# Patient Record
Sex: MALE | Race: WHITE | NOT HISPANIC OR LATINO
[De-identification: names, ages, dates, MRNs, and addresses within clinical notes are randomized per-mention and may not be internally consistent; named-entity substitution may affect disease eponyms.]

---

## 2018-01-19 PROBLEM — Z00.00 ENCOUNTER FOR PREVENTIVE HEALTH EXAMINATION: Status: ACTIVE | Noted: 2018-01-19

## 2018-02-20 ENCOUNTER — APPOINTMENT (OUTPATIENT)
Dept: ENDOCRINOLOGY | Facility: CLINIC | Age: 73
End: 2018-02-20

## 2018-06-18 ENCOUNTER — LABORATORY RESULT (OUTPATIENT)
Age: 73
End: 2018-06-18

## 2018-06-18 ENCOUNTER — APPOINTMENT (OUTPATIENT)
Dept: NEPHROLOGY | Facility: CLINIC | Age: 73
End: 2018-06-18
Payer: MEDICARE

## 2018-06-18 VITALS — HEIGHT: 65 IN | BODY MASS INDEX: 27.49 KG/M2 | WEIGHT: 165 LBS

## 2018-06-18 VITALS — SYSTOLIC BLOOD PRESSURE: 155 MMHG | DIASTOLIC BLOOD PRESSURE: 83 MMHG | HEART RATE: 51 BPM

## 2018-06-18 VITALS — DIASTOLIC BLOOD PRESSURE: 110 MMHG | HEART RATE: 61 BPM | SYSTOLIC BLOOD PRESSURE: 184 MMHG

## 2018-06-18 DIAGNOSIS — M25.569 PAIN IN UNSPECIFIED KNEE: ICD-10-CM

## 2018-06-18 DIAGNOSIS — Z78.9 OTHER SPECIFIED HEALTH STATUS: ICD-10-CM

## 2018-06-18 DIAGNOSIS — Z87.891 PERSONAL HISTORY OF NICOTINE DEPENDENCE: ICD-10-CM

## 2018-06-18 DIAGNOSIS — Z84.1 FAMILY HISTORY OF DISORDERS OF KIDNEY AND URETER: ICD-10-CM

## 2018-06-18 PROCEDURE — 36415 COLL VENOUS BLD VENIPUNCTURE: CPT

## 2018-06-18 PROCEDURE — 99205 OFFICE O/P NEW HI 60 MIN: CPT | Mod: 25

## 2018-06-24 LAB
25(OH)D3 SERPL-MCNC: 33.5 NG/ML
ALBUMIN SERPL ELPH-MCNC: 4.9 G/DL
ALP BLD-CCNC: 75 U/L
ALT SERPL-CCNC: 29 U/L
ANION GAP SERPL CALC-SCNC: 15 MMOL/L
APPEARANCE: CLEAR
AST SERPL-CCNC: 27 U/L
BASOPHILS # BLD AUTO: 0.04 K/UL
BASOPHILS NFR BLD AUTO: 0.6 %
BILIRUB SERPL-MCNC: 0.7 MG/DL
BILIRUBIN URINE: NEGATIVE
BLOOD URINE: NEGATIVE
BUN SERPL-MCNC: 34 MG/DL
CALCIUM SERPL-MCNC: 10.1 MG/DL
CALCIUM SERPL-MCNC: 10.1 MG/DL
CHLORIDE SERPL-SCNC: 103 MMOL/L
CO2 SERPL-SCNC: 25 MMOL/L
COLOR: YELLOW
CREAT SERPL-MCNC: 1.75 MG/DL
CREAT SPEC-SCNC: 82 MG/DL
DEPRECATED KAPPA LC FREE/LAMBDA SER: 0.98 RATIO
EOSINOPHIL # BLD AUTO: 0.08 K/UL
EOSINOPHIL NFR BLD AUTO: 1.2 %
GLUCOSE QUALITATIVE U: 500 MG/DL
GLUCOSE SERPL-MCNC: 86 MG/DL
HCT VFR BLD CALC: 44.2 %
HGB BLD-MCNC: 13.9 G/DL
IGA 24H UR QL IFE: NORMAL
IMM GRANULOCYTES NFR BLD AUTO: 0.2 %
KAPPA LC CSF-MCNC: 1.92 MG/DL
KAPPA LC SERPL-MCNC: 1.88 MG/DL
KETONES URINE: NEGATIVE
LEUKOCYTE ESTERASE URINE: NEGATIVE
LYMPHOCYTES # BLD AUTO: 2.03 K/UL
LYMPHOCYTES NFR BLD AUTO: 31.5 %
M PROTEIN SPEC IFE-MCNC: NORMAL
MAGNESIUM SERPL-MCNC: 2.1 MG/DL
MAN DIFF?: NORMAL
MCHC RBC-ENTMCNC: 25.7 PG
MCHC RBC-ENTMCNC: 31.4 GM/DL
MCV RBC AUTO: 81.9 FL
MICROALBUMIN 24H UR DL<=1MG/L-MCNC: 12.2 MG/DL
MICROALBUMIN/CREAT 24H UR-RTO: 149 MG/G
MONOCYTES # BLD AUTO: 0.66 K/UL
MONOCYTES NFR BLD AUTO: 10.2 %
NEUTROPHILS # BLD AUTO: 3.62 K/UL
NEUTROPHILS NFR BLD AUTO: 56.3 %
NITRITE URINE: NEGATIVE
PARATHYROID HORMONE INTACT: 72 PG/ML
PH URINE: 5
PHOSPHATE SERPL-MCNC: 3.3 MG/DL
PLATELET # BLD AUTO: 198 K/UL
POTASSIUM SERPL-SCNC: 4.7 MMOL/L
PROT SERPL-MCNC: 7.4 G/DL
PROTEIN URINE: 30 MG/DL
PSA SERPL-MCNC: 6.43 NG/ML
RBC # BLD: 5.4 M/UL
RBC # FLD: 15.8 %
SODIUM SERPL-SCNC: 143 MMOL/L
SPECIFIC GRAVITY URINE: 1.02
TSH SERPL-ACNC: 0.78 UIU/ML
URATE SERPL-MCNC: 7.2 MG/DL
UROBILINOGEN URINE: NEGATIVE MG/DL
VIT B12 SERPL-MCNC: 798 PG/ML
WBC # FLD AUTO: 6.44 K/UL

## 2018-07-09 ENCOUNTER — FORM ENCOUNTER (OUTPATIENT)
Age: 73
End: 2018-07-09

## 2018-07-10 ENCOUNTER — APPOINTMENT (OUTPATIENT)
Dept: ULTRASOUND IMAGING | Facility: HOSPITAL | Age: 73
End: 2018-07-10
Payer: MEDICARE

## 2018-07-10 ENCOUNTER — OUTPATIENT (OUTPATIENT)
Dept: OUTPATIENT SERVICES | Facility: HOSPITAL | Age: 73
LOS: 1 days | End: 2018-07-10
Payer: MEDICARE

## 2018-07-10 PROCEDURE — 76770 US EXAM ABDO BACK WALL COMP: CPT | Mod: 26

## 2018-07-11 PROCEDURE — 76770 US EXAM ABDO BACK WALL COMP: CPT

## 2018-07-11 PROCEDURE — 90715 TDAP VACCINE 7 YRS/> IM: CPT

## 2018-07-16 ENCOUNTER — APPOINTMENT (OUTPATIENT)
Dept: NEPHROLOGY | Facility: CLINIC | Age: 73
End: 2018-07-16
Payer: MEDICARE

## 2018-07-16 ENCOUNTER — LABORATORY RESULT (OUTPATIENT)
Age: 73
End: 2018-07-16

## 2018-07-16 VITALS — WEIGHT: 162 LBS | BODY MASS INDEX: 26.96 KG/M2

## 2018-07-16 VITALS — HEART RATE: 55 BPM | SYSTOLIC BLOOD PRESSURE: 126 MMHG | DIASTOLIC BLOOD PRESSURE: 78 MMHG

## 2018-07-16 PROCEDURE — 36415 COLL VENOUS BLD VENIPUNCTURE: CPT

## 2018-07-16 PROCEDURE — 99214 OFFICE O/P EST MOD 30 MIN: CPT | Mod: 25

## 2018-07-21 LAB
ALBUMIN SERPL ELPH-MCNC: 4.9 G/DL
ALP BLD-CCNC: 76 U/L
ALT SERPL-CCNC: 22 U/L
ANION GAP SERPL CALC-SCNC: 17 MMOL/L
APPEARANCE: CLEAR
AST SERPL-CCNC: 25 U/L
BASOPHILS # BLD AUTO: 0.04 K/UL
BASOPHILS NFR BLD AUTO: 0.5 %
BILIRUB SERPL-MCNC: 0.7 MG/DL
BILIRUBIN URINE: NEGATIVE
BLOOD URINE: NEGATIVE
BUN SERPL-MCNC: 44 MG/DL
CALCIUM SERPL-MCNC: 10.2 MG/DL
CHLORIDE SERPL-SCNC: 99 MMOL/L
CO2 SERPL-SCNC: 26 MMOL/L
COLOR: YELLOW
CREAT SERPL-MCNC: 1.84 MG/DL
CREAT SPEC-SCNC: 135 MG/DL
EOSINOPHIL # BLD AUTO: 0.15 K/UL
EOSINOPHIL NFR BLD AUTO: 1.8 %
GLUCOSE QUALITATIVE U: NEGATIVE MG/DL
GLUCOSE SERPL-MCNC: 106 MG/DL
HCT VFR BLD CALC: 45.3 %
HGB BLD-MCNC: 14.8 G/DL
IMM GRANULOCYTES NFR BLD AUTO: 0.1 %
KETONES URINE: NEGATIVE
LEUKOCYTE ESTERASE URINE: NEGATIVE
LYMPHOCYTES # BLD AUTO: 2.3 K/UL
LYMPHOCYTES NFR BLD AUTO: 28.4 %
MAGNESIUM SERPL-MCNC: 2.2 MG/DL
MAN DIFF?: NORMAL
MCHC RBC-ENTMCNC: 27.2 PG
MCHC RBC-ENTMCNC: 32.7 GM/DL
MCV RBC AUTO: 83.1 FL
MICROALBUMIN 24H UR DL<=1MG/L-MCNC: 17.7 MG/DL
MICROALBUMIN/CREAT 24H UR-RTO: 131 MG/G
MONOCYTES # BLD AUTO: 0.73 K/UL
MONOCYTES NFR BLD AUTO: 9 %
NEUTROPHILS # BLD AUTO: 4.88 K/UL
NEUTROPHILS NFR BLD AUTO: 60.2 %
NITRITE URINE: NEGATIVE
PH URINE: 5
PHOSPHATE SERPL-MCNC: 3.7 MG/DL
PLATELET # BLD AUTO: 217 K/UL
POTASSIUM SERPL-SCNC: 4.6 MMOL/L
PROT SERPL-MCNC: 7.9 G/DL
PROTEIN URINE: 100 MG/DL
RBC # BLD: 5.45 M/UL
RBC # FLD: 15.3 %
SODIUM SERPL-SCNC: 142 MMOL/L
SPECIFIC GRAVITY URINE: 1.02
UROBILINOGEN URINE: NEGATIVE MG/DL
WBC # FLD AUTO: 8.11 K/UL

## 2018-10-18 ENCOUNTER — APPOINTMENT (OUTPATIENT)
Dept: NEPHROLOGY | Facility: CLINIC | Age: 73
End: 2018-10-18

## 2018-11-14 ENCOUNTER — APPOINTMENT (OUTPATIENT)
Dept: NEPHROLOGY | Facility: CLINIC | Age: 73
End: 2018-11-14
Payer: MEDICARE

## 2018-11-14 VITALS — WEIGHT: 173 LBS | BODY MASS INDEX: 28.79 KG/M2

## 2018-11-14 VITALS — DIASTOLIC BLOOD PRESSURE: 79 MMHG | HEART RATE: 58 BPM | SYSTOLIC BLOOD PRESSURE: 145 MMHG

## 2018-11-14 VITALS — DIASTOLIC BLOOD PRESSURE: 84 MMHG | SYSTOLIC BLOOD PRESSURE: 148 MMHG

## 2018-11-14 PROCEDURE — 99214 OFFICE O/P EST MOD 30 MIN: CPT

## 2018-12-18 ENCOUNTER — APPOINTMENT (OUTPATIENT)
Dept: NEPHROLOGY | Facility: CLINIC | Age: 73
End: 2018-12-18
Payer: MEDICARE

## 2018-12-18 VITALS — SYSTOLIC BLOOD PRESSURE: 127 MMHG | HEART RATE: 60 BPM | DIASTOLIC BLOOD PRESSURE: 72 MMHG

## 2018-12-18 VITALS — DIASTOLIC BLOOD PRESSURE: 77 MMHG | SYSTOLIC BLOOD PRESSURE: 124 MMHG

## 2018-12-18 VITALS — BODY MASS INDEX: 29.29 KG/M2 | WEIGHT: 176 LBS

## 2018-12-18 PROCEDURE — 99214 OFFICE O/P EST MOD 30 MIN: CPT

## 2019-01-29 ENCOUNTER — LABORATORY RESULT (OUTPATIENT)
Age: 74
End: 2019-01-29

## 2019-01-29 ENCOUNTER — APPOINTMENT (OUTPATIENT)
Dept: NEPHROLOGY | Facility: CLINIC | Age: 74
End: 2019-01-29
Payer: MEDICARE

## 2019-01-29 VITALS — SYSTOLIC BLOOD PRESSURE: 133 MMHG | HEART RATE: 69 BPM | DIASTOLIC BLOOD PRESSURE: 73 MMHG

## 2019-01-29 VITALS — BODY MASS INDEX: 28.96 KG/M2 | WEIGHT: 174 LBS

## 2019-01-29 VITALS — SYSTOLIC BLOOD PRESSURE: 130 MMHG | DIASTOLIC BLOOD PRESSURE: 73 MMHG

## 2019-01-29 PROCEDURE — 99214 OFFICE O/P EST MOD 30 MIN: CPT | Mod: 25

## 2019-01-29 PROCEDURE — 36415 COLL VENOUS BLD VENIPUNCTURE: CPT

## 2019-01-29 RX ORDER — POTASSIUM CITRATE 10 MEQ/1
10 MEQ TABLET, EXTENDED RELEASE ORAL
Qty: 180 | Refills: 3 | Status: DISCONTINUED | COMMUNITY
Start: 2018-12-18 | End: 2019-01-29

## 2019-01-29 NOTE — PHYSICAL EXAM
[General Appearance - Alert] : alert [General Appearance - In No Acute Distress] : in no acute distress [Sclera] : the sclera and conjunctiva were normal [PERRL With Normal Accommodation] : pupils were equal in size, round, and reactive to light [Extraocular Movements] : extraocular movements were intact [Outer Ear] : the ears and nose were normal in appearance [Oropharynx] : the oropharynx was normal [Neck Appearance] : the appearance of the neck was normal [Neck Cervical Mass (___cm)] : no neck mass was observed [Jugular Venous Distention Increased] : there was no jugular-venous distention [Thyroid Diffuse Enlargement] : the thyroid was not enlarged [Thyroid Nodule] : there were no palpable thyroid nodules [Auscultation Breath Sounds / Voice Sounds] : lungs were clear to auscultation bilaterally [Heart Rate And Rhythm] : heart rate was normal and rhythm regular [Heart Sounds] : normal S1 and S2 [Heart Sounds Gallop] : no gallops [Murmurs] : no murmurs [Heart Sounds Pericardial Friction Rub] : no pericardial rub [Veins - Varicosity Changes] : there were no varicosital changes [FreeTextEntry1] : pitting bilateral ankle edema [Bowel Sounds] : normal bowel sounds [Abdomen Soft] : soft [Abdomen Tenderness] : non-tender [Abdomen Mass (___ Cm)] : no abdominal mass palpated [Cervical Lymph Nodes Enlarged Posterior Bilaterally] : posterior cervical [Cervical Lymph Nodes Enlarged Anterior Bilaterally] : anterior cervical [Supraclavicular Lymph Nodes Enlarged Bilaterally] : supraclavicular [Abnormal Walk] : normal gait [Nail Clubbing] : no clubbing  or cyanosis of the fingernails [Musculoskeletal - Swelling] : no joint swelling seen [Motor Tone] : muscle strength and tone were normal [Skin Color & Pigmentation] : normal skin color and pigmentation [Skin Turgor] : normal skin turgor [] : no rash [Oriented To Time, Place, And Person] : oriented to person, place, and time [Impaired Insight] : insight and judgment were intact [Affect] : the affect was normal

## 2019-01-29 NOTE — ASSESSMENT
[FreeTextEntry1] : # CKD stage 3.\par * The patient has been counseled that chronic kidney disease is a significant condition and regular office followup with me (at least every 2 months for now) is essential for monitoring, and that it is their responsibility to make a follow up appointment. The patient has been counseled never to stop taking their medications without discussing it with me or another doctor. The patient has been counseled on risk of acute renal failure and instructed to immediately call and speak with me or go immediately to ER with any severe symptoms, nausea, vomiting, diarrhea, chest pain, or shortness of breath. The patient has been counseled on avoiding NSAIDs. Reducing or avoiding red meat and starting folate 800 mg qd has been advised. A counseling information sheet has been given. All their questions were answered.\par * Recheck labs next visit. \par \par # Nephrolithiasis.\par * Low urine citrate. Start sodium bicarbonate 650 mg 3 tabs daily (instead of urocit K, which wasn't tolerated). \par \par # PSA elevation.\par * Follow up with urologist. His PSA has been persistently elevated and this is known. \par * Recheck. \par \par # HTN uncontrolled.\par * Increase toprol to 100 mg. \par * The patient's blood pressure was checked with the Omron HEM-907XL using the SPRINT trial protocol after sitting quietly in an empty room with arm supported, back supported, and feet on the floor for 5 minutes. The average of 3 readings were taken. \par * The patient has been advised to check their BP at home with an automatic arm cuff, write down the readings, and reach me directly on the phone immediately if they are persistently > 180 systolic or if SBP is less than 100 or if lightheadedness develops. They were advised to bring in all blood pressure readings and medications next visit.\par * The patient has been counseled that they have a a significant medical condition and regular office followup (at least every 2 month for now) is essential for monitoring, and that it is their responsibility to make follow up appointments. The patient also understands that they must never stop or change any medications without discussing this with me (or another physician). A counseling information sheet has been given. All their questions were answered. \par

## 2019-01-29 NOTE — HISTORY OF PRESENT ILLNESS
[FreeTextEntry1] : Referred by Dr. Lezama for CKD. PCP: Dr. Paul Guzman. \par \par * HTN borderline controlled. /80s  at home. No lightheadedness. Compliant with medications. Following low salt diet.  * In 2016 he had stenting of left renal artery. * He requires omeprazole for severe GERD and ranitidine has been ineffective. * Urine albumin 131 mg/g.* CKD stable, creatinine 1.84.  * He is following up closely with urologist for elevated PSA. * DM controlled, HGA1c 6.8. *  24 hour urine on 2/18 showed low urine citrate. His last kidney stone was in 2016. 11/17 US did not show nephrolithiasis. He was unable to tolerate potassium citrate due to GERD. \par \par

## 2019-01-29 NOTE — CONSULT LETTER
[FreeTextEntry1] : Dear Dr. Guzman,\par \par I had the pleasure of seeing Wilfred Pruitt in followup for kidney disease. My note is attached.\par \par Thank you for the opportunity to participate in the care of your patient.\par \par Please call me on my cell phone at [phone] or in the office at [phone] if you have any questions.\par \par Best regards,\par \par Kodak\par \par Kodak Humphrey MD, FACP, FASN\par [email or web address]\par Office: [phone]\par Mobile: [phone]

## 2019-02-05 ENCOUNTER — TRANSCRIPTION ENCOUNTER (OUTPATIENT)
Age: 74
End: 2019-02-05

## 2019-02-09 LAB
25(OH)D3 SERPL-MCNC: 28.3 NG/ML
ALBUMIN SERPL ELPH-MCNC: 4.6 G/DL
ALP BLD-CCNC: 68 U/L
ALT SERPL-CCNC: 26 U/L
ANION GAP SERPL CALC-SCNC: 15 MMOL/L
APPEARANCE: CLEAR
AST SERPL-CCNC: 27 U/L
BASOPHILS # BLD AUTO: 0.03 K/UL
BASOPHILS NFR BLD AUTO: 0.5 %
BILIRUB SERPL-MCNC: 0.6 MG/DL
BILIRUBIN URINE: NEGATIVE
BLOOD URINE: NEGATIVE
BUN SERPL-MCNC: 40 MG/DL
CALCIUM SERPL-MCNC: 9.8 MG/DL
CALCIUM SERPL-MCNC: 9.8 MG/DL
CHLORIDE SERPL-SCNC: 104 MMOL/L
CHOLEST SERPL-MCNC: 182 MG/DL
CHOLEST/HDLC SERPL: 4.3 RATIO
CO2 SERPL-SCNC: 23 MMOL/L
COLOR: YELLOW
CREAT SERPL-MCNC: 1.84 MG/DL
CREAT SPEC-SCNC: 93 MG/DL
CREAT SPEC-SCNC: 93 MG/DL
CREAT/PROT UR: 0.7 RATIO
EOSINOPHIL # BLD AUTO: 0.07 K/UL
EOSINOPHIL NFR BLD AUTO: 1.1 %
FERRITIN SERPL-MCNC: 130 NG/ML
GLUCOSE QUALITATIVE U: 500 MG/DL
GLUCOSE SERPL-MCNC: 166 MG/DL
HBA1C MFR BLD HPLC: 7.6 %
HCT VFR BLD CALC: 45 %
HDLC SERPL-MCNC: 42 MG/DL
HGB BLD-MCNC: 14.6 G/DL
IMM GRANULOCYTES NFR BLD AUTO: 0.2 %
KETONES URINE: NEGATIVE
LDLC SERPL CALC-MCNC: 89 MG/DL
LEUKOCYTE ESTERASE URINE: NEGATIVE
LYMPHOCYTES # BLD AUTO: 2 K/UL
LYMPHOCYTES NFR BLD AUTO: 31.4 %
MAGNESIUM SERPL-MCNC: 2.1 MG/DL
MAN DIFF?: NORMAL
MCHC RBC-ENTMCNC: 27.2 PG
MCHC RBC-ENTMCNC: 32.4 GM/DL
MCV RBC AUTO: 84 FL
MICROALBUMIN 24H UR DL<=1MG/L-MCNC: 27.9 MG/DL
MICROALBUMIN/CREAT 24H UR-RTO: 299 MG/G
MONOCYTES # BLD AUTO: 0.39 K/UL
MONOCYTES NFR BLD AUTO: 6.1 %
NEUTROPHILS # BLD AUTO: 3.87 K/UL
NEUTROPHILS NFR BLD AUTO: 60.7 %
NITRITE URINE: NEGATIVE
PARATHYROID HORMONE INTACT: 71 PG/ML
PH URINE: 5.5
PHOSPHATE SERPL-MCNC: 3.1 MG/DL
PLATELET # BLD AUTO: 233 K/UL
POTASSIUM SERPL-SCNC: 4 MMOL/L
PROT SERPL-MCNC: 7.4 G/DL
PROT UR-MCNC: 66 MG/DL
PROTEIN URINE: 30 MG/DL
PSA SERPL-MCNC: 6.47 NG/ML
RBC # BLD: 5.36 M/UL
RBC # FLD: 15.1 %
SODIUM SERPL-SCNC: 142 MMOL/L
SPECIFIC GRAVITY URINE: 1.02
T4 FREE SERPL-MCNC: 1.5 NG/DL
TRIGL SERPL-MCNC: 253 MG/DL
TSH SERPL-ACNC: 0.9 UIU/ML
URATE SERPL-MCNC: 7.8 MG/DL
UROBILINOGEN URINE: NEGATIVE MG/DL
VIT B12 SERPL-MCNC: 951 PG/ML
WBC # FLD AUTO: 6.37 K/UL

## 2019-04-02 ENCOUNTER — APPOINTMENT (OUTPATIENT)
Dept: NEPHROLOGY | Facility: CLINIC | Age: 74
End: 2019-04-02
Payer: MEDICARE

## 2019-04-02 ENCOUNTER — LABORATORY RESULT (OUTPATIENT)
Age: 74
End: 2019-04-02

## 2019-04-02 VITALS — SYSTOLIC BLOOD PRESSURE: 139 MMHG | HEART RATE: 61 BPM | DIASTOLIC BLOOD PRESSURE: 77 MMHG

## 2019-04-02 PROCEDURE — 99214 OFFICE O/P EST MOD 30 MIN: CPT

## 2019-04-02 NOTE — ASSESSMENT
[FreeTextEntry1] : # CKD stage 3 consistent with DM.\par * Recheck labs.\par * Start lisinopril 2.5 mg daily for CKD/proteinuria. Benefits, alternatives, and risks to medication including but not limited to low blood pressure, electrolyte problems discussed and they consent. UpToDate patient information form on medication provided.\par * The patient has been counseled that chronic kidney disease is a significant condition and regular office followup with me (at least every 1 month for now) is essential for monitoring, and that it is their responsibility to make a follow up appointment.\par * The patient has been counseled never to stop taking their medications without discussing it with me or another doctor.\par * The patient has been counseled on risk of acute renal failure and instructed to immediately call and speak with me or go immediately to ER with any severe symptoms, nausea, vomiting, diarrhea, chest pain, or shortness of breath.\par * The patient has been counseled on avoiding NSAIDs.\par * Reducing or avoiding red meat, following a relatively low oxalate diet, and starting folate 800 mg qd has been advised.\par * A counseling information sheet has been given and they were provided sufficient time to review this. All their questions were answered.\par \par # HTN uncontrolled.\par * The patient's blood pressure was checked with the Omron [id number]-907XL using the SPRINT trial protocol after sitting quietly in an empty room with arm supported, back supported, and feet on the floor for 5 minutes. The average of 3 readings were taken. \par * The patient has been advised to check their BP at home with an automatic arm cuff, write down the readings, and reach me directly on the phone immediately if they are persistently > 180 systolic or if SBP is less than 100 or if lightheadedness develops. They were advised to bring in all blood pressure readings and medications next visit.\par * The patient has been counseled that they have a a significant medical condition and regular office followup (at least every 1 month for now) is essential for monitoring, and that it is their responsibility to make follow up appointments.\par * The patient also has been counseled that they must never stop or change any medications without discussing this with me (or another physician). \par * A counseling information sheet has been given and they were provided sufficient time to review this sheet. All their questions were answered.\par \par # Acidosis.\par * Cont bicarb.\par \par # Proteinuria.\par * Follow up on lisinopril.

## 2019-04-02 NOTE — HISTORY OF PRESENT ILLNESS
[FreeTextEntry1] : Referred by Dr. Lezama for CKD. PCP: Dr. Paul Guzman. \par \par * CKD stable, creatinine 1.84. * DM uncontrolled. HGA1c 7.4. *  Urine protein 700 mg/g. \par \par Previous history (29Jan19): * HTN borderline controlled. /80s  at home. No lightheadedness. Compliant with medications. Following low salt diet.  * In 2016 he had stenting of left renal artery. * He requires omeprazole for severe GERD and ranitidine has been ineffective. * Urine albumin 131 mg/g.* CKD stable, creatinine 1.84.  * He is following up closely with urologist for elevated PSA. * DM controlled, HGA1c 6.8. *  24 hour urine on 2/18 showed low urine citrate. His last kidney stone was in 2016. 11/17 US did not show nephrolithiasis. He was unable to tolerate potassium citrate due to GERD. \par \par

## 2019-04-02 NOTE — CONSULT LETTER
[FreeTextEntry1] : Dear Dr. Guzman,\par \par I had the pleasure of seeing Wilfred Pruitt in followup for kidney disease. My note is attached.\par \par Thank you for the opportunity to participate in the care of your patient.\par \par Please call me on my cell phone at [phone] or in the office at [phone] if you have any questions.\par \par Best regards,\par \par Kodak\par \par Kodak Humphrey MD, FACP, FASN\par [email or web address]\par Office: [phone]\par Mobile: [phone]

## 2019-04-09 ENCOUNTER — TRANSCRIPTION ENCOUNTER (OUTPATIENT)
Age: 74
End: 2019-04-09

## 2019-04-09 LAB
ALBUMIN SERPL ELPH-MCNC: 4.6 G/DL
ALP BLD-CCNC: 80 U/L
ALT SERPL-CCNC: 20 U/L
ANION GAP SERPL CALC-SCNC: 13 MMOL/L
APPEARANCE: CLEAR
AST SERPL-CCNC: 21 U/L
BASOPHILS # BLD AUTO: 0.04 K/UL
BASOPHILS NFR BLD AUTO: 0.5 %
BILIRUB SERPL-MCNC: 0.6 MG/DL
BILIRUBIN URINE: NEGATIVE
BLOOD URINE: NEGATIVE
BUN SERPL-MCNC: 41 MG/DL
CALCIUM SERPL-MCNC: 9.9 MG/DL
CHLORIDE SERPL-SCNC: 101 MMOL/L
CO2 SERPL-SCNC: 27 MMOL/L
COLOR: NORMAL
CREAT SERPL-MCNC: 1.89 MG/DL
CREAT SPEC-SCNC: 126 MG/DL
CREAT SPEC-SCNC: 126 MG/DL
CREAT/PROT UR: 0.4 RATIO
EOSINOPHIL # BLD AUTO: 0.1 K/UL
EOSINOPHIL NFR BLD AUTO: 1.2 %
GLUCOSE QUALITATIVE U: ABNORMAL
GLUCOSE SERPL-MCNC: 166 MG/DL
HCT VFR BLD CALC: 46.1 %
HGB BLD-MCNC: 14.6 G/DL
IMM GRANULOCYTES NFR BLD AUTO: 0.2 %
KETONES URINE: NEGATIVE
LEUKOCYTE ESTERASE URINE: NEGATIVE
LYMPHOCYTES # BLD AUTO: 2.24 K/UL
LYMPHOCYTES NFR BLD AUTO: 26.5 %
MAGNESIUM SERPL-MCNC: 2.3 MG/DL
MAN DIFF?: NORMAL
MCHC RBC-ENTMCNC: 26.8 PG
MCHC RBC-ENTMCNC: 31.7 GM/DL
MCV RBC AUTO: 84.7 FL
MICROALBUMIN 24H UR DL<=1MG/L-MCNC: 22.3 MG/DL
MICROALBUMIN/CREAT 24H UR-RTO: 178 MG/G
MONOCYTES # BLD AUTO: 0.75 K/UL
MONOCYTES NFR BLD AUTO: 8.9 %
NEUTROPHILS # BLD AUTO: 5.3 K/UL
NEUTROPHILS NFR BLD AUTO: 62.7 %
NITRITE URINE: NEGATIVE
PH URINE: 5.5
PLATELET # BLD AUTO: 255 K/UL
POTASSIUM SERPL-SCNC: 4.4 MMOL/L
PROT SERPL-MCNC: 6.9 G/DL
PROT UR-MCNC: 55 MG/DL
PROTEIN URINE: ABNORMAL
RBC # BLD: 5.44 M/UL
RBC # FLD: 14.2 %
SODIUM SERPL-SCNC: 141 MMOL/L
SPECIFIC GRAVITY URINE: 1.02
UROBILINOGEN URINE: NORMAL
WBC # FLD AUTO: 8.45 K/UL

## 2019-05-07 ENCOUNTER — APPOINTMENT (OUTPATIENT)
Dept: NEPHROLOGY | Facility: CLINIC | Age: 74
End: 2019-05-07
Payer: MEDICARE

## 2019-05-07 VITALS
TEMPERATURE: 98.3 F | WEIGHT: 169 LBS | BODY MASS INDEX: 28.16 KG/M2 | HEART RATE: 53 BPM | OXYGEN SATURATION: 96 % | HEIGHT: 65 IN

## 2019-05-07 VITALS — DIASTOLIC BLOOD PRESSURE: 69 MMHG | SYSTOLIC BLOOD PRESSURE: 124 MMHG | HEART RATE: 56 BPM

## 2019-05-07 PROCEDURE — 36415 COLL VENOUS BLD VENIPUNCTURE: CPT

## 2019-05-07 PROCEDURE — 99214 OFFICE O/P EST MOD 30 MIN: CPT | Mod: 25

## 2019-05-07 NOTE — ASSESSMENT
[FreeTextEntry1] : # CKD stage 3 consistent with DM.\par * Recheck labs.\par * The patient has been counseled that chronic kidney disease is a significant condition and regular office followup with me (at least every 3 months for now) is essential for monitoring, and that it is their responsibility to make a follow up appointment.\par * The patient has been counseled never to stop taking their medications without discussing it with me or another doctor.\par * The patient has been counseled on risk of acute renal failure and instructed to immediately call and speak with me or go immediately to ER with any severe symptoms, nausea, vomiting, diarrhea, chest pain, or shortness of breath.\par * The patient has been counseled on avoiding NSAIDs.\par * Reducing or avoiding red meat, following a relatively low oxalate diet, and starting folate 800 mg qd has been advised.\par * A counseling information sheet has been given and they were provided sufficient time to review this. All their questions were answered.\par \par # HTN controlled.\par * The patient's blood pressure was checked with the Omron [id number]-907XL using the SPRINT trial protocol after sitting quietly in an empty room with arm supported, back supported, and feet on the floor for 5 minutes. The average of 3 readings were taken. \par * The patient has been advised to check their BP at home with an automatic arm cuff, write down the readings, and reach me directly on the phone immediately if they are persistently > 180 systolic or if SBP is less than 100 or if lightheadedness develops. They were advised to bring in all blood pressure readings and medications next visit.\par * The patient has been counseled that they have a a significant medical condition and regular office followup (at least every 3 months for now) is essential for monitoring, and that it is their responsibility to make follow up appointments.\par * The patient also has been counseled that they must never stop or change any medications without discussing this with me (or another physician). \par * A counseling information sheet has been given and they were provided sufficient time to review this sheet. All their questions were answered.\par \par # Proteinuria.\par * Follow up on lisinopril.\par \par # Acidosis.\par * Cont bicarbonate. \par

## 2019-05-07 NOTE — HISTORY OF PRESENT ILLNESS
[FreeTextEntry1] : Referred by Dr. Lezama for CKD. PCP: Dr. Paul Guzman. \par \par * CKD stable, creatinine 1.89 last visit. * HTN previously uncontrolled but has decreased to 120/70s at home on lisinopril. *  Urine protein decreased to 400 mg/g. * \par \par Previous history (02Apr19): * CKD stable, creatinine 1.84. * DM uncontrolled. HGA1c 7.4. \par \par Previous history (29Jan19): * HTN borderline controlled. /80s  at home. No lightheadedness. Compliant with medications. Following low salt diet.  * In 2016 he had stenting of left renal artery. * He requires omeprazole for severe GERD and ranitidine has been ineffective. * Urine albumin 131 mg/g.* CKD stable, creatinine 1.84.  * He is following up closely with urologist for elevated PSA. * DM controlled, HGA1c 6.8. *  24 hour urine on 2/18 showed low urine citrate. His last kidney stone was in 2016. 11/17 US did not show nephrolithiasis. He was unable to tolerate potassium citrate due to GERD. \par \par

## 2019-05-07 NOTE — PHYSICAL EXAM
[General Appearance - Alert] : alert [General Appearance - In No Acute Distress] : in no acute distress [Sclera] : the sclera and conjunctiva were normal [PERRL With Normal Accommodation] : pupils were equal in size, round, and reactive to light [Extraocular Movements] : extraocular movements were intact [Outer Ear] : the ears and nose were normal in appearance [Oropharynx] : the oropharynx was normal [Neck Appearance] : the appearance of the neck was normal [Neck Cervical Mass (___cm)] : no neck mass was observed [Jugular Venous Distention Increased] : there was no jugular-venous distention [Thyroid Diffuse Enlargement] : the thyroid was not enlarged [Thyroid Nodule] : there were no palpable thyroid nodules [Auscultation Breath Sounds / Voice Sounds] : lungs were clear to auscultation bilaterally [Heart Rate And Rhythm] : heart rate was normal and rhythm regular [Murmurs] : no murmurs [Heart Sounds Gallop] : no gallops [Heart Sounds] : normal S1 and S2 [Heart Sounds Pericardial Friction Rub] : no pericardial rub [FreeTextEntry1] : pitting bilateral ankle edema [Veins - Varicosity Changes] : there were no varicosital changes [Abdomen Tenderness] : non-tender [Abdomen Soft] : soft [Bowel Sounds] : normal bowel sounds [Abdomen Mass (___ Cm)] : no abdominal mass palpated [Cervical Lymph Nodes Enlarged Posterior Bilaterally] : posterior cervical [Cervical Lymph Nodes Enlarged Anterior Bilaterally] : anterior cervical [Abnormal Walk] : normal gait [Supraclavicular Lymph Nodes Enlarged Bilaterally] : supraclavicular [Musculoskeletal - Swelling] : no joint swelling seen [Nail Clubbing] : no clubbing  or cyanosis of the fingernails [Motor Tone] : muscle strength and tone were normal [Skin Turgor] : normal skin turgor [Skin Color & Pigmentation] : normal skin color and pigmentation [] : no rash [Impaired Insight] : insight and judgment were intact [Oriented To Time, Place, And Person] : oriented to person, place, and time [Affect] : the affect was normal

## 2019-05-07 NOTE — CONSULT LETTER
[FreeTextEntry1] : Dear Andrew,\par \par I had the pleasure of seeing Wilfred Pruitt in followup for kidney disease. My note is attached.\par \par Thank you for the opportunity to participate in the care of your patient.\par \par Please call me on my cell phone at [phone] or in the office at [phone] if you have any questions.\par \par Best regards,\par \par Kodak\par \par Kodak Humphrey MD, FACP, FASN\par [email or web address]\par Office: [phone]\par Mobile: [phone]

## 2019-05-13 LAB
ALBUMIN SERPL ELPH-MCNC: 4.6 G/DL
ALP BLD-CCNC: 60 U/L
ALT SERPL-CCNC: 23 U/L
ANION GAP SERPL CALC-SCNC: 16 MMOL/L
APPEARANCE: CLEAR
AST SERPL-CCNC: 23 U/L
BASOPHILS # BLD AUTO: 0.05 K/UL
BASOPHILS NFR BLD AUTO: 0.6 %
BILIRUB SERPL-MCNC: 0.6 MG/DL
BILIRUBIN URINE: NEGATIVE
BLOOD URINE: NEGATIVE
BUN SERPL-MCNC: 40 MG/DL
CALCIUM SERPL-MCNC: 9.4 MG/DL
CHLORIDE SERPL-SCNC: 105 MMOL/L
CO2 SERPL-SCNC: 22 MMOL/L
COLOR: NORMAL
CREAT SERPL-MCNC: 1.83 MG/DL
CREAT SPEC-SCNC: 82 MG/DL
CREAT/PROT UR: 0.3 RATIO
EOSINOPHIL # BLD AUTO: 0.12 K/UL
EOSINOPHIL NFR BLD AUTO: 1.5 %
GLUCOSE QUALITATIVE U: NEGATIVE
GLUCOSE SERPL-MCNC: 113 MG/DL
HCT VFR BLD CALC: 44.4 %
HGB BLD-MCNC: 14.2 G/DL
IMM GRANULOCYTES NFR BLD AUTO: 0.2 %
KETONES URINE: NEGATIVE
LEUKOCYTE ESTERASE URINE: NEGATIVE
LYMPHOCYTES # BLD AUTO: 2.4 K/UL
LYMPHOCYTES NFR BLD AUTO: 29.4 %
MAN DIFF?: NORMAL
MCHC RBC-ENTMCNC: 27 PG
MCHC RBC-ENTMCNC: 32 GM/DL
MCV RBC AUTO: 84.6 FL
MONOCYTES # BLD AUTO: 0.8 K/UL
MONOCYTES NFR BLD AUTO: 9.8 %
NEUTROPHILS # BLD AUTO: 4.78 K/UL
NEUTROPHILS NFR BLD AUTO: 58.5 %
NITRITE URINE: NEGATIVE
PH URINE: 5.5
PLATELET # BLD AUTO: 223 K/UL
POTASSIUM SERPL-SCNC: 4.4 MMOL/L
PROT SERPL-MCNC: 7 G/DL
PROT UR-MCNC: 28 MG/DL
PROTEIN URINE: NORMAL
RBC # BLD: 5.25 M/UL
RBC # FLD: 14.6 %
SODIUM SERPL-SCNC: 142 MMOL/L
SPECIFIC GRAVITY URINE: 1.01
UROBILINOGEN URINE: NORMAL
WBC # FLD AUTO: 8.17 K/UL

## 2019-08-20 ENCOUNTER — APPOINTMENT (OUTPATIENT)
Dept: NEPHROLOGY | Facility: CLINIC | Age: 74
End: 2019-08-20
Payer: MEDICARE

## 2019-08-20 VITALS — HEART RATE: 54 BPM | SYSTOLIC BLOOD PRESSURE: 136 MMHG | DIASTOLIC BLOOD PRESSURE: 71 MMHG

## 2019-08-20 PROCEDURE — 99214 OFFICE O/P EST MOD 30 MIN: CPT | Mod: 25

## 2019-08-20 PROCEDURE — 36415 COLL VENOUS BLD VENIPUNCTURE: CPT

## 2019-08-20 NOTE — PHYSICAL EXAM
[General Appearance - In No Acute Distress] : in no acute distress [General Appearance - Alert] : alert [Extraocular Movements] : extraocular movements were intact [Sclera] : the sclera and conjunctiva were normal [PERRL With Normal Accommodation] : pupils were equal in size, round, and reactive to light [Outer Ear] : the ears and nose were normal in appearance [Oropharynx] : the oropharynx was normal [Neck Cervical Mass (___cm)] : no neck mass was observed [Neck Appearance] : the appearance of the neck was normal [Thyroid Diffuse Enlargement] : the thyroid was not enlarged [Jugular Venous Distention Increased] : there was no jugular-venous distention [Thyroid Nodule] : there were no palpable thyroid nodules [Heart Rate And Rhythm] : heart rate was normal and rhythm regular [Auscultation Breath Sounds / Voice Sounds] : lungs were clear to auscultation bilaterally [Heart Sounds] : normal S1 and S2 [Heart Sounds Gallop] : no gallops [Murmurs] : no murmurs [Heart Sounds Pericardial Friction Rub] : no pericardial rub [Veins - Varicosity Changes] : there were no varicosital changes [Bowel Sounds] : normal bowel sounds [Abdomen Soft] : soft [Abdomen Tenderness] : non-tender [Cervical Lymph Nodes Enlarged Posterior Bilaterally] : posterior cervical [Abdomen Mass (___ Cm)] : no abdominal mass palpated [Supraclavicular Lymph Nodes Enlarged Bilaterally] : supraclavicular [Cervical Lymph Nodes Enlarged Anterior Bilaterally] : anterior cervical [Abnormal Walk] : normal gait [Nail Clubbing] : no clubbing  or cyanosis of the fingernails [Musculoskeletal - Swelling] : no joint swelling seen [Skin Color & Pigmentation] : normal skin color and pigmentation [Motor Tone] : muscle strength and tone were normal [Skin Turgor] : normal skin turgor [Oriented To Time, Place, And Person] : oriented to person, place, and time [] : no rash [Impaired Insight] : insight and judgment were intact [Affect] : the affect was normal [FreeTextEntry1] : pitting bilateral ankle edema

## 2019-08-20 NOTE — HISTORY OF PRESENT ILLNESS
[FreeTextEntry1] : Referred by Dr. Lezama for CKD. PCP: Dr. Paul Guzman. \par \par * CKD stable, creatinine 1.83. * BP 130s at home. He decreased diuretic to every other day. * Urine protein decreased to 300 mg/g. \par \par Previous history (07May19): * CKD stable, creatinine 1.89 last visit. * HTN previously uncontrolled but has decreased to 120/70s at home on lisinopril. *  Urine protein decreased to 400 mg/g. * \par \par Previous history (02Apr19): * CKD stable, creatinine 1.84. * DM uncontrolled. HGA1c 7.4. \par \par Previous history (29Jan19): * HTN borderline controlled. /80s  at home. No lightheadedness. Compliant with medications. Following low salt diet.  * In 2016 he had stenting of left renal artery. * He requires omeprazole for severe GERD and ranitidine has been ineffective. * Urine albumin 131 mg/g.* CKD stable, creatinine 1.84.  * He is following up closely with urologist for elevated PSA. * DM controlled, HGA1c 6.8. *  24 hour urine on 2/18 showed low urine citrate. His last kidney stone was in 2016. 11/17 US did not show nephrolithiasis. He was unable to tolerate potassium citrate due to GERD. \par \par

## 2019-08-20 NOTE — ASSESSMENT
[FreeTextEntry1] : # HTN borderline controlled.\par * Increase liisnopril to 5 mg daily.\par * The patient's blood pressure was checked with the Omron HEM-907XL using the SPRINT trial protocol after sitting quietly in an empty room with arm supported, back supported, and feet on the floor for 5 minutes. The average of 3 readings were taken. \par * The patient has been advised to check their BP at home with an automatic arm cuff, write down the readings, and reach me directly on the phone immediately if they are persistently > 180 systolic or if SBP is less than 100 or if lightheadedness develops. They were advised to bring in all blood pressure readings and medications next visit.\par * The patient has been counseled that they have a a significant medical condition and regular office followup (at least every 1 months for now) is essential for monitoring, and that it is their responsibility to make follow up appointments.\par * The patient also has been counseled that they must never stop or change any medications without discussing this with me (or another physician). \par * A counseling information sheet has been given and they were provided sufficient time to review this sheet. All their questions were answered.\par \par # CKD stage 3 c/w DM.\par * Recheck labs.\par * * The patient has been counseled that chronic kidney disease is a significant condition and regular office followup with me (at least every 1 month for now) is essential for monitoring, and that it is their responsibility to make a follow up appointment.\par * The patient has been counseled never to stop taking their medications without discussing it with me or another doctor.\par * The patient has been counseled on risk of acute renal failure and instructed to immediately call and speak with me or go immediately to ER with any severe symptoms, nausea, vomiting, diarrhea, chest pain, or shortness of breath.\par * The patient has been counseled on avoiding NSAIDs.\par * Reducing or avoiding red meat, following a relatively low oxalate diet, and starting folate 800 mg qd has been advised.\par * A counseling information sheet has been given and they were provided sufficient time to review this. All their questions were answered.\par \par # Increase PSA.\par * Recheck.\par * Folllow up with urollogy.

## 2019-08-30 LAB
25(OH)D3 SERPL-MCNC: 31.6 NG/ML
ALBUMIN SERPL ELPH-MCNC: 4.7 G/DL
ALP BLD-CCNC: 64 U/L
ALT SERPL-CCNC: 28 U/L
ANION GAP SERPL CALC-SCNC: 12 MMOL/L
APPEARANCE: CLEAR
AST SERPL-CCNC: 27 U/L
BASOPHILS # BLD AUTO: 0.05 K/UL
BASOPHILS NFR BLD AUTO: 0.7 %
BILIRUB SERPL-MCNC: 0.6 MG/DL
BILIRUBIN URINE: NEGATIVE
BLOOD URINE: NEGATIVE
BUN SERPL-MCNC: 46 MG/DL
CALCIUM SERPL-MCNC: 9.9 MG/DL
CALCIUM SERPL-MCNC: 9.9 MG/DL
CHLORIDE SERPL-SCNC: 108 MMOL/L
CHOLEST SERPL-MCNC: 188 MG/DL
CHOLEST/HDLC SERPL: 3.9 RATIO
CO2 SERPL-SCNC: 23 MMOL/L
COLOR: NORMAL
CREAT SERPL-MCNC: 1.85 MG/DL
CREAT SPEC-SCNC: 70 MG/DL
CREAT SPEC-SCNC: 70 MG/DL
CREAT/PROT UR: 0.3 RATIO
EOSINOPHIL # BLD AUTO: 0.1 K/UL
EOSINOPHIL NFR BLD AUTO: 1.3 %
ESTIMATED AVERAGE GLUCOSE: 140 MG/DL
FERRITIN SERPL-MCNC: 158 NG/ML
GLUCOSE QUALITATIVE U: NEGATIVE
GLUCOSE SERPL-MCNC: 109 MG/DL
HBA1C MFR BLD HPLC: 6.5 %
HCT VFR BLD CALC: 43.1 %
HDLC SERPL-MCNC: 48 MG/DL
HGB BLD-MCNC: 13.1 G/DL
IMM GRANULOCYTES NFR BLD AUTO: 0.4 %
KETONES URINE: NEGATIVE
LDLC SERPL CALC-MCNC: 90 MG/DL
LEUKOCYTE ESTERASE URINE: NEGATIVE
LYMPHOCYTES # BLD AUTO: 1.95 K/UL
LYMPHOCYTES NFR BLD AUTO: 25.4 %
MAGNESIUM SERPL-MCNC: 2.2 MG/DL
MAN DIFF?: NORMAL
MCHC RBC-ENTMCNC: 26.4 PG
MCHC RBC-ENTMCNC: 30.4 GM/DL
MCV RBC AUTO: 86.9 FL
MICROALBUMIN 24H UR DL<=1MG/L-MCNC: 9.2 MG/DL
MICROALBUMIN/CREAT 24H UR-RTO: 132 MG/G
MONOCYTES # BLD AUTO: 0.73 K/UL
MONOCYTES NFR BLD AUTO: 9.5 %
NEUTROPHILS # BLD AUTO: 4.83 K/UL
NEUTROPHILS NFR BLD AUTO: 62.7 %
NITRITE URINE: NEGATIVE
PARATHYROID HORMONE INTACT: 50 PG/ML
PH URINE: 5
PHOSPHATE SERPL-MCNC: 3.7 MG/DL
PLATELET # BLD AUTO: 210 K/UL
POTASSIUM SERPL-SCNC: 5.1 MMOL/L
PROT SERPL-MCNC: 7 G/DL
PROT UR-MCNC: 23 MG/DL
PROTEIN URINE: NORMAL
PSA SERPL-MCNC: 7.31 NG/ML
RBC # BLD: 4.96 M/UL
RBC # FLD: 15.3 %
SODIUM SERPL-SCNC: 143 MMOL/L
SPECIFIC GRAVITY URINE: 1.02
T4 FREE SERPL-MCNC: 1.3 NG/DL
TRIGL SERPL-MCNC: 252 MG/DL
TSH SERPL-ACNC: 0.81 UIU/ML
URATE SERPL-MCNC: 7.2 MG/DL
UROBILINOGEN URINE: NORMAL
VIT B12 SERPL-MCNC: 812 PG/ML
WBC # FLD AUTO: 7.69 K/UL

## 2019-09-25 ENCOUNTER — APPOINTMENT (OUTPATIENT)
Dept: NEPHROLOGY | Facility: CLINIC | Age: 74
End: 2019-09-25
Payer: MEDICARE

## 2019-09-25 VITALS — DIASTOLIC BLOOD PRESSURE: 67 MMHG | SYSTOLIC BLOOD PRESSURE: 120 MMHG | HEART RATE: 50 BPM

## 2019-09-25 VITALS — WEIGHT: 168.25 LBS | BODY MASS INDEX: 28.03 KG/M2 | HEIGHT: 65 IN

## 2019-09-25 PROCEDURE — 99214 OFFICE O/P EST MOD 30 MIN: CPT | Mod: 25

## 2019-09-25 PROCEDURE — 36415 COLL VENOUS BLD VENIPUNCTURE: CPT

## 2019-09-25 RX ORDER — OMEPRAZOLE 20 MG/1
20 CAPSULE, DELAYED RELEASE ORAL
Qty: 90 | Refills: 3 | Status: DISCONTINUED | COMMUNITY
Start: 2018-06-18 | End: 2019-09-25

## 2019-09-25 NOTE — ASSESSMENT
[FreeTextEntry1] : # HTN controlled.\par * The patient's blood pressure was checked with the Omron HEM-907XL using the SPRINT trial protocol after sitting quietly in an empty room with arm supported, back supported, and feet on the floor for 5 minutes. The average of 3 readings were taken. \par * The patient has been advised to check their BP at home with an automatic arm cuff, write down the readings, and reach me directly on the phone immediately if they are persistently > 180 systolic or if SBP is less than 100 or if lightheadedness develops. They were advised to bring in all blood pressure readings and medications next visit.\par * The patient has been counseled that they have a a significant medical condition and regular office followup (at least every 4 months for now) is essential for monitoring, and that it is their responsibility to make follow up appointments.\par * The patient also has been counseled that they must never stop or change any medications without discussing this with me (or another physician). \par * A counseling information sheet has been given and they were provided sufficient time to review this sheet. All their questions were answered.\par \par # CKD stable.\par * The patient has been counseled that chronic kidney disease is a significant condition and regular office followup with me (at least every 4 months for now) is essential for monitoring, and that it is their responsibility to make a follow up appointment.\par * The patient has been counseled never to stop taking their medications without discussing it with me or another doctor.\par * The patient has been counseled on risk of acute renal failure and instructed to immediately call and speak with me or go immediately to ER with any severe symptoms, nausea, vomiting, diarrhea, chest pain, or shortness of breath.\par * The patient has been counseled on avoiding NSAIDs.\par * Reducing or avoiding red meat, following a relatively low oxalate diet, and starting folate 800 mg qd has been advised.\par * A counseling information sheet has been given and they were provided sufficient time to review this. All their questions were answered.\par \par # Increase PSA.\par * Follow up with urology. \par \par # URI.\par * Counseling form on viral illnesses given.\par * They will contact me immediatley with worsening symptoms, shortness of breath, fever, inability to eat or drink, or with any other new or severe symptoms.

## 2019-09-25 NOTE — PHYSICAL EXAM
[General Appearance - Alert] : alert [Sclera] : the sclera and conjunctiva were normal [General Appearance - In No Acute Distress] : in no acute distress [Extraocular Movements] : extraocular movements were intact [Outer Ear] : the ears and nose were normal in appearance [PERRL With Normal Accommodation] : pupils were equal in size, round, and reactive to light [Oropharynx] : the oropharynx was normal [Neck Appearance] : the appearance of the neck was normal [Neck Cervical Mass (___cm)] : no neck mass was observed [Jugular Venous Distention Increased] : there was no jugular-venous distention [Thyroid Diffuse Enlargement] : the thyroid was not enlarged [Thyroid Nodule] : there were no palpable thyroid nodules [Auscultation Breath Sounds / Voice Sounds] : lungs were clear to auscultation bilaterally [Heart Rate And Rhythm] : heart rate was normal and rhythm regular [Heart Sounds] : normal S1 and S2 [Heart Sounds Gallop] : no gallops [Murmurs] : no murmurs [Heart Sounds Pericardial Friction Rub] : no pericardial rub [Veins - Varicosity Changes] : there were no varicosital changes [Bowel Sounds] : normal bowel sounds [FreeTextEntry1] : pitting bilateral ankle edema [Abdomen Soft] : soft [Abdomen Tenderness] : non-tender [Abdomen Mass (___ Cm)] : no abdominal mass palpated [Cervical Lymph Nodes Enlarged Anterior Bilaterally] : anterior cervical [Cervical Lymph Nodes Enlarged Posterior Bilaterally] : posterior cervical [Supraclavicular Lymph Nodes Enlarged Bilaterally] : supraclavicular [Nail Clubbing] : no clubbing  or cyanosis of the fingernails [Abnormal Walk] : normal gait [Motor Tone] : muscle strength and tone were normal [Musculoskeletal - Swelling] : no joint swelling seen [Skin Color & Pigmentation] : normal skin color and pigmentation [Skin Turgor] : normal skin turgor [Oriented To Time, Place, And Person] : oriented to person, place, and time [Impaired Insight] : insight and judgment were intact [] : no rash [Affect] : the affect was normal

## 2019-09-25 NOTE — HISTORY OF PRESENT ILLNESS
[FreeTextEntry1] : Referred by Dr. Lezama for CKD. PCP: Dr. Paul Guzman. \par \par * Elevated PSA to 7.31. He is following up closely with urologist. * CKD stable, creatinine 1.85. * HTN controlled. No lightheadedness. Compliant with medications. * Slight rhinitis. \par \par Previous history (20Aug19): * CKD stable, creatinine 1.83. * BP 130s at home. He decreased diuretic to every other day. * Urine protein decreased to 300 mg/g. \par \par Previous history (07May19): * CKD stable, creatinine 1.89 last visit. * HTN previously uncontrolled but has decreased to 120/70s at home on lisinopril. *  Urine protein decreased to 400 mg/g. * \par \par Previous history (02Apr19): * CKD stable, creatinine 1.84. * DM uncontrolled. HGA1c 7.4. \par \par Previous history (29Jan19): * HTN borderline controlled. /80s  at home. No lightheadedness. Compliant with medications. Following low salt diet.  * In 2016 he had stenting of left renal artery. * He requires omeprazole for severe GERD and ranitidine has been ineffective. * Urine albumin 131 mg/g.* CKD stable, creatinine 1.84.  * He is following up closely with urologist for elevated PSA. * DM controlled, HGA1c 6.8. *  24 hour urine on 2/18 showed low urine citrate. His last kidney stone was in 2016. 11/17 US did not show nephrolithiasis. He was unable to tolerate potassium citrate due to GERD. \par \par

## 2019-10-05 LAB
ALBUMIN SERPL ELPH-MCNC: 5 G/DL
ALP BLD-CCNC: 64 U/L
ALT SERPL-CCNC: 25 U/L
ANION GAP SERPL CALC-SCNC: 15 MMOL/L
AST SERPL-CCNC: 24 U/L
BASOPHILS # BLD AUTO: 0.02 K/UL
BASOPHILS NFR BLD AUTO: 0.3 %
BILIRUB SERPL-MCNC: 0.6 MG/DL
BUN SERPL-MCNC: 41 MG/DL
CALCIUM SERPL-MCNC: 9.8 MG/DL
CHLORIDE SERPL-SCNC: 105 MMOL/L
CO2 SERPL-SCNC: 23 MMOL/L
CREAT SERPL-MCNC: 2.2 MG/DL
EOSINOPHIL # BLD AUTO: 0.03 K/UL
EOSINOPHIL NFR BLD AUTO: 0.5 %
GLUCOSE SERPL-MCNC: 99 MG/DL
HCT VFR BLD CALC: 45.4 %
HGB BLD-MCNC: 14 G/DL
IMM GRANULOCYTES NFR BLD AUTO: 0.3 %
LYMPHOCYTES # BLD AUTO: 2 K/UL
LYMPHOCYTES NFR BLD AUTO: 32.7 %
MAGNESIUM SERPL-MCNC: 2.4 MG/DL
MAN DIFF?: NORMAL
MCHC RBC-ENTMCNC: 27.3 PG
MCHC RBC-ENTMCNC: 30.8 GM/DL
MCV RBC AUTO: 88.5 FL
MONOCYTES # BLD AUTO: 0.96 K/UL
MONOCYTES NFR BLD AUTO: 15.7 %
NEUTROPHILS # BLD AUTO: 3.08 K/UL
NEUTROPHILS NFR BLD AUTO: 50.5 %
PLATELET # BLD AUTO: 197 K/UL
POTASSIUM SERPL-SCNC: 4.9 MMOL/L
PROT SERPL-MCNC: 7.5 G/DL
RBC # BLD: 5.13 M/UL
RBC # FLD: 14.7 %
SODIUM SERPL-SCNC: 143 MMOL/L
WBC # FLD AUTO: 6.11 K/UL

## 2019-12-18 ENCOUNTER — APPOINTMENT (OUTPATIENT)
Dept: NEPHROLOGY | Facility: CLINIC | Age: 74
End: 2019-12-18
Payer: MEDICARE

## 2019-12-18 VITALS — SYSTOLIC BLOOD PRESSURE: 124 MMHG | DIASTOLIC BLOOD PRESSURE: 64 MMHG | HEART RATE: 52 BPM

## 2019-12-18 PROCEDURE — 36415 COLL VENOUS BLD VENIPUNCTURE: CPT

## 2019-12-18 PROCEDURE — 99214 OFFICE O/P EST MOD 30 MIN: CPT | Mod: 25

## 2019-12-18 NOTE — HISTORY OF PRESENT ILLNESS
[FreeTextEntry1] : Referred by Dr. Lezama for CKD. PCP: Dr. Paul Guzman. \par \par * He is following up with urologist for elevated PSA. * CKD relatively stable, creatinine up to 1.85. * HTN controlled. Average /70s at home No lightheadedness. Compliant with medications. DM controlled, HGA1c 6.5. * Albuminuria 132 mg/g. \par \par Previous history (25Sep19): * Elevated PSA to 7.31. He is following up closely with urologist. * CKD stable, creatinine 1.85. * HTN controlled. No lightheadedness. Compliant with medications. * Slight rhinitis. \par \par Previous history (20Aug19): * CKD stable, creatinine 1.83. * BP 130s at home. He decreased diuretic to every other day. * Urine protein decreased to 300 mg/g. \par \par Previous history (07May19): * CKD stable, creatinine 1.89 last visit. * HTN previously uncontrolled but has decreased to 120/70s at home on lisinopril. *  Urine protein decreased to 400 mg/g. * \par \par Previous history (02Apr19): * CKD stable, creatinine 1.84. * DM uncontrolled. HGA1c 7.4. \par \par Previous history (29Jan19): * HTN borderline controlled. /80s  at home. No lightheadedness. Compliant with medications. Following low salt diet.  * In 2016 he had stenting of left renal artery. * He requires omeprazole for severe GERD and ranitidine has been ineffective. * Urine albumin 131 mg/g.* CKD stable, creatinine 1.84.  * He is following up closely with urologist for elevated PSA. * DM controlled, HGA1c 6.8. *  24 hour urine on 2/18 showed low urine citrate. His last kidney stone was in 2016. 11/17 US did not show nephrolithiasis. He was unable to tolerate potassium citrate due to GERD. \par \par

## 2019-12-18 NOTE — PHYSICAL EXAM
[General Appearance - Alert] : alert [PERRL With Normal Accommodation] : pupils were equal in size, round, and reactive to light [General Appearance - In No Acute Distress] : in no acute distress [Sclera] : the sclera and conjunctiva were normal [Extraocular Movements] : extraocular movements were intact [Oropharynx] : the oropharynx was normal [Outer Ear] : the ears and nose were normal in appearance [Neck Cervical Mass (___cm)] : no neck mass was observed [Neck Appearance] : the appearance of the neck was normal [Thyroid Diffuse Enlargement] : the thyroid was not enlarged [Jugular Venous Distention Increased] : there was no jugular-venous distention [Thyroid Nodule] : there were no palpable thyroid nodules [Auscultation Breath Sounds / Voice Sounds] : lungs were clear to auscultation bilaterally [Heart Sounds] : normal S1 and S2 [Heart Rate And Rhythm] : heart rate was normal and rhythm regular [Murmurs] : no murmurs [Heart Sounds Gallop] : no gallops [Heart Sounds Pericardial Friction Rub] : no pericardial rub [Abdomen Soft] : soft [Bowel Sounds] : normal bowel sounds [Veins - Varicosity Changes] : there were no varicosital changes [Abdomen Mass (___ Cm)] : no abdominal mass palpated [Abdomen Tenderness] : non-tender [Supraclavicular Lymph Nodes Enlarged Bilaterally] : supraclavicular [Cervical Lymph Nodes Enlarged Posterior Bilaterally] : posterior cervical [Cervical Lymph Nodes Enlarged Anterior Bilaterally] : anterior cervical [Abnormal Walk] : normal gait [Nail Clubbing] : no clubbing  or cyanosis of the fingernails [Musculoskeletal - Swelling] : no joint swelling seen [Motor Tone] : muscle strength and tone were normal [Skin Turgor] : normal skin turgor [] : no rash [Skin Color & Pigmentation] : normal skin color and pigmentation [Oriented To Time, Place, And Person] : oriented to person, place, and time [Affect] : the affect was normal [Impaired Insight] : insight and judgment were intact [FreeTextEntry1] : pitting bilateral ankle edema

## 2019-12-29 LAB
25(OH)D3 SERPL-MCNC: 40.7 NG/ML
ALBUMIN SERPL ELPH-MCNC: 4.6 G/DL
ALP BLD-CCNC: 59 U/L
ALT SERPL-CCNC: 24 U/L
ANION GAP SERPL CALC-SCNC: 16 MMOL/L
APPEARANCE: CLEAR
AST SERPL-CCNC: 21 U/L
BASOPHILS # BLD AUTO: 0.05 K/UL
BASOPHILS NFR BLD AUTO: 0.7 %
BILIRUB SERPL-MCNC: 0.5 MG/DL
BILIRUBIN URINE: NEGATIVE
BLOOD URINE: NEGATIVE
BUN SERPL-MCNC: 52 MG/DL
CALCIUM SERPL-MCNC: 10.3 MG/DL
CALCIUM SERPL-MCNC: 10.3 MG/DL
CHLORIDE SERPL-SCNC: 106 MMOL/L
CHOLEST SERPL-MCNC: 182 MG/DL
CHOLEST/HDLC SERPL: 3.8 RATIO
CO2 SERPL-SCNC: 20 MMOL/L
COLOR: NORMAL
CREAT SERPL-MCNC: 1.87 MG/DL
CREAT SPEC-SCNC: 67 MG/DL
CREAT SPEC-SCNC: 67 MG/DL
CREAT/PROT UR: 0.2 RATIO
CYSTATIN C SERPL-MCNC: 1.79 MG/L
EOSINOPHIL # BLD AUTO: 0.1 K/UL
EOSINOPHIL NFR BLD AUTO: 1.3 %
ESTIMATED AVERAGE GLUCOSE: 143 MG/DL
FERRITIN SERPL-MCNC: 187 NG/ML
GFR/BSA.PRED SERPLBLD CYS-BASED-ARV: 34 ML/MIN
GLUCOSE QUALITATIVE U: NEGATIVE
GLUCOSE SERPL-MCNC: 96 MG/DL
HBA1C MFR BLD HPLC: 6.6 %
HCT VFR BLD CALC: 45.7 %
HDLC SERPL-MCNC: 48 MG/DL
HGB BLD-MCNC: 14.1 G/DL
IMM GRANULOCYTES NFR BLD AUTO: 0.3 %
KETONES URINE: NEGATIVE
LDLC SERPL CALC-MCNC: 88 MG/DL
LEUKOCYTE ESTERASE URINE: NEGATIVE
LYMPHOCYTES # BLD AUTO: 2.03 K/UL
LYMPHOCYTES NFR BLD AUTO: 26.5 %
MAGNESIUM SERPL-MCNC: 2.3 MG/DL
MAN DIFF?: NORMAL
MCHC RBC-ENTMCNC: 26.9 PG
MCHC RBC-ENTMCNC: 30.9 GM/DL
MCV RBC AUTO: 87.2 FL
MICROALBUMIN 24H UR DL<=1MG/L-MCNC: 4.3 MG/DL
MICROALBUMIN/CREAT 24H UR-RTO: 64 MG/G
MONOCYTES # BLD AUTO: 0.68 K/UL
MONOCYTES NFR BLD AUTO: 8.9 %
NEUTROPHILS # BLD AUTO: 4.78 K/UL
NEUTROPHILS NFR BLD AUTO: 62.3 %
NITRITE URINE: NEGATIVE
PARATHYROID HORMONE INTACT: 36 PG/ML
PH URINE: 5
PHOSPHATE SERPL-MCNC: 4 MG/DL
PLATELET # BLD AUTO: 207 K/UL
POTASSIUM SERPL-SCNC: 5.5 MMOL/L
PROT SERPL-MCNC: 7 G/DL
PROT UR-MCNC: 13 MG/DL
PROTEIN URINE: NEGATIVE
PSA SERPL-MCNC: 7.51 NG/ML
RBC # BLD: 5.24 M/UL
RBC # FLD: 15 %
SODIUM SERPL-SCNC: 142 MMOL/L
SPECIFIC GRAVITY URINE: 1.01
TRIGL SERPL-MCNC: 229 MG/DL
TSH SERPL-ACNC: 0.83 UIU/ML
URATE SERPL-MCNC: 8.3 MG/DL
UROBILINOGEN URINE: NORMAL
VIT B12 SERPL-MCNC: 1019 PG/ML
WBC # FLD AUTO: 7.66 K/UL

## 2020-03-05 ENCOUNTER — RX RENEWAL (OUTPATIENT)
Age: 75
End: 2020-03-05

## 2020-06-04 ENCOUNTER — APPOINTMENT (OUTPATIENT)
Dept: NEPHROLOGY | Facility: CLINIC | Age: 75
End: 2020-06-04
Payer: MEDICARE

## 2020-06-04 VITALS — WEIGHT: 169 LBS | BODY MASS INDEX: 27.16 KG/M2 | HEIGHT: 66 IN

## 2020-06-04 DIAGNOSIS — R97.20 ELEVATED PROSTATE, SPECIFIC ANTIGEN [PSA]: ICD-10-CM

## 2020-06-04 DIAGNOSIS — Z11.59 ENCOUNTER FOR SCREENING FOR OTHER VIRAL DISEASES: ICD-10-CM

## 2020-06-04 DIAGNOSIS — Z03.818 ENCOUNTER FOR OBSERVATION FOR SUSPECTED EXPOSURE TO OTHER BIOLOGICAL AGENTS RULED OUT: ICD-10-CM

## 2020-06-04 PROCEDURE — 99213 OFFICE O/P EST LOW 20 MIN: CPT | Mod: CS,95

## 2020-06-04 NOTE — HISTORY OF PRESENT ILLNESS
[FreeTextEntry1] : Referred by Dr. Lezama for CKD. PCP: Dr. Paul Guzman. \par \par * HTN controlled, BP 120s/70s. No lightheadedness. * CKD stable, creatinine 1.87. * He is following up with urologist for elevated PSA. * K increased to 5.5. He has been eating bananas (despite being counseled not to) and will now stop. * DM controlled, HGA1c 6.6. * No kidney stone for 4 years. Attempting to drink 2-3 liters daily. \par \par Previous history (18Dec19): * He is following up with urologist for elevated PSA. * CKD relatively stable, creatinine up to 1.85. * HTN controlled. Average /70s at home No lightheadedness. Compliant with medications. DM controlled, HGA1c 6.5. * Albuminuria 132 mg/g. \par \par Previous history (25Sep19): * Elevated PSA to 7.31. He is following up closely with urologist. * CKD stable, creatinine 1.85. * HTN controlled. No lightheadedness. Compliant with medications. * Slight rhinitis. \par \par Previous history (20Aug19): * CKD stable, creatinine 1.83. * BP 130s at home. He decreased diuretic to every other day. * Urine protein decreased to 300 mg/g. \par \par Previous history (07May19): * CKD stable, creatinine 1.89 last visit. * HTN previously uncontrolled but has decreased to 120/70s at home on lisinopril. *  Urine protein decreased to 400 mg/g. * \par \par Previous history (02Apr19): * CKD stable, creatinine 1.84. * DM uncontrolled. HGA1c 7.4. \par \par Previous history (29Jan19): * HTN borderline controlled. /80s  at home. No lightheadedness. Compliant with medications. Following low salt diet.  * In 2016 he had stenting of left renal artery. * He requires omeprazole for severe GERD and ranitidine has been ineffective. * Urine albumin 131 mg/g.* CKD stable, creatinine 1.84.  * He is following up closely with urologist for elevated PSA. * DM controlled, HGA1c 6.8. *  24 hour urine on 2/18 showed low urine citrate. His last kidney stone was in 2016. 11/17 US did not show nephrolithiasis. He was unable to tolerate potassium citrate due to GERD. \par \par

## 2020-06-04 NOTE — ASSESSMENT
[FreeTextEntry1] : # HTN controlled.\par * A counseling information sheet has been given (currently or previously, in-person or electronically). All their questions were answered.\par * The patient has been counseled to check their BP at home with an automatic arm cuff, write down the readings, and reach me directly on the phone immediately if they are persistently > 180 systolic or if SBP is less than 100 or if lightheadedness develops. They were counseled to bring in all blood pressure readings and medications next visit.\par * The patient has been counseled that they have a a significant medical condition and regular office followup (at least every 3 months for now) is essential for monitoring, and that it is their responsibility to make follow up appointments.\par * The patient also has been counseled that they must never stop or change any medications without discussing this with me (or another physician). \par \par # CKD stage 3 likely due to DM (diet controlled).\par * Recheck labs.\par * Benefit of SGLT2 inhibitor uncertain given lack of significant proteinuria currently.\par * The patient has been counseled that chronic kidney disease is a significant condition and regular office followup with me (at least every 3 months for now) is essential for monitoring, and that it is their responsibility to make a follow up appointment.\par * A counseling information sheet has been given (currently or previously, in-person or electronically). All their questions were answered.\par * The patient has been counseled never to stop taking their medications without discussing it with me or another doctor.\par * The patient has been counseled on risk of acute renal failure and instructed to immediately call and speak with me or go immediately to ER with any severe symptoms, nausea, vomiting, diarrhea, chest pain, or shortness of breath.\par * The patient has been counseled on avoiding NSAIDs.\par \par # Kidney stones.\par * High fluid intake.\par \par # Hyperkalemia.\par * Low K diet reinforced. Counseling given. \par \par # SARS-CoV-2 pandemic counseling. No current evidence of infection.\par * Counseled to stay physically distanced.\par * Counseled to limit all nonessential travel.\par * Counseled on mask wearing.\par * Counseled on hand hygiene.\par * Counseled to contact a doctor with fever, respiratory symptoms, or anosmia.\par * Counseled on obtaining flu shot this year when available. \par \par

## 2020-06-04 NOTE — REASON FOR VISIT
[Home] : at home, [unfilled] , at the time of the visit. [Medical Office: ([facility])___] : at the medical office located in  [Verbal consent obtained from patient] : the patient, [unfilled] [FreeTextEntry1] : CKD

## 2020-06-14 LAB
25(OH)D3 SERPL-MCNC: 43.2 NG/ML
ALBUMIN SERPL ELPH-MCNC: 4.7 G/DL
ALP BLD-CCNC: 71 U/L
ALT SERPL-CCNC: 19 U/L
ANION GAP SERPL CALC-SCNC: 17 MMOL/L
APPEARANCE: CLEAR
AST SERPL-CCNC: 22 U/L
BASOPHILS # BLD AUTO: 0.04 K/UL
BASOPHILS NFR BLD AUTO: 0.6 %
BILIRUB SERPL-MCNC: 0.6 MG/DL
BILIRUBIN URINE: NEGATIVE
BLOOD URINE: NEGATIVE
BUN SERPL-MCNC: 42 MG/DL
CALCIUM SERPL-MCNC: 10 MG/DL
CALCIUM SERPL-MCNC: 10 MG/DL
CHLORIDE SERPL-SCNC: 106 MMOL/L
CHOLEST SERPL-MCNC: 164 MG/DL
CHOLEST/HDLC SERPL: 3.4 RATIO
CO2 SERPL-SCNC: 18 MMOL/L
COLOR: NORMAL
CREAT SERPL-MCNC: 1.77 MG/DL
CREAT SPEC-SCNC: 62 MG/DL
CREAT SPEC-SCNC: 62 MG/DL
CREAT/PROT UR: 0.3 RATIO
EOSINOPHIL # BLD AUTO: 0.08 K/UL
EOSINOPHIL NFR BLD AUTO: 1.2 %
FERRITIN SERPL-MCNC: 156 NG/ML
GLUCOSE QUALITATIVE U: NORMAL
GLUCOSE SERPL-MCNC: 123 MG/DL
HCT VFR BLD CALC: 44.2 %
HDLC SERPL-MCNC: 48 MG/DL
HGB BLD-MCNC: 14.1 G/DL
IMM GRANULOCYTES NFR BLD AUTO: 0.3 %
KETONES URINE: NEGATIVE
LDLC SERPL CALC-MCNC: 87 MG/DL
LEUKOCYTE ESTERASE URINE: NEGATIVE
LYMPHOCYTES # BLD AUTO: 1.87 K/UL
LYMPHOCYTES NFR BLD AUTO: 28.6 %
MAGNESIUM SERPL-MCNC: 2.2 MG/DL
MAN DIFF?: NORMAL
MCHC RBC-ENTMCNC: 27.3 PG
MCHC RBC-ENTMCNC: 31.9 GM/DL
MCV RBC AUTO: 85.7 FL
MICROALBUMIN 24H UR DL<=1MG/L-MCNC: 6.5 MG/DL
MICROALBUMIN/CREAT 24H UR-RTO: 104 MG/G
MONOCYTES # BLD AUTO: 0.73 K/UL
MONOCYTES NFR BLD AUTO: 11.2 %
NEUTROPHILS # BLD AUTO: 3.8 K/UL
NEUTROPHILS NFR BLD AUTO: 58.1 %
NITRITE URINE: NEGATIVE
PARATHYROID HORMONE INTACT: 47 PG/ML
PH URINE: 6
PHOSPHATE SERPL-MCNC: 3.2 MG/DL
PLATELET # BLD AUTO: 209 K/UL
POTASSIUM SERPL-SCNC: 4.9 MMOL/L
PROT SERPL-MCNC: 7 G/DL
PROT UR-MCNC: 21 MG/DL
PROTEIN URINE: NORMAL
RBC # BLD: 5.16 M/UL
RBC # FLD: 14.8 %
SARS-COV-2 IGG SERPL IA-ACNC: <0.1 INDEX
SARS-COV-2 IGG SERPL QL IA: NEGATIVE
SODIUM SERPL-SCNC: 141 MMOL/L
SPECIFIC GRAVITY URINE: 1.02
TRIGL SERPL-MCNC: 147 MG/DL
TSH SERPL-ACNC: 0.73 UIU/ML
URATE SERPL-MCNC: 8.9 MG/DL
UROBILINOGEN URINE: NORMAL
VIT B12 SERPL-MCNC: 873 PG/ML
WBC # FLD AUTO: 6.54 K/UL

## 2020-07-14 ENCOUNTER — APPOINTMENT (OUTPATIENT)
Dept: UROLOGY | Facility: CLINIC | Age: 75
End: 2020-07-14
Payer: MEDICARE

## 2020-07-14 VITALS — HEART RATE: 54 BPM | SYSTOLIC BLOOD PRESSURE: 159 MMHG | DIASTOLIC BLOOD PRESSURE: 70 MMHG | TEMPERATURE: 96.7 F

## 2020-07-14 PROCEDURE — 99358 PROLONG SERVICE W/O CONTACT: CPT

## 2020-07-14 PROCEDURE — 99204 OFFICE O/P NEW MOD 45 MIN: CPT

## 2020-07-14 NOTE — PHYSICAL EXAM
[General Appearance - Well Developed] : well developed [Edema] : no peripheral edema [] : no rash [Abdomen Soft] : soft [Oriented To Time, Place, And Person] : oriented to person, place, and time [Sensation] : the sensory exam was normal to light touch and pinprick [Not Anxious] : not anxious [Urethral Meatus] : meatus normal [Penis Abnormality] : normal circumcised penis [Testes Tenderness] : no tenderness of the testes [Testes Mass (___cm)] : there were no testicular masses [No Prostate Nodules] : no prostate nodules [Prostate Size ___ gm] : prostate size [unfilled] gm

## 2020-07-14 NOTE — HISTORY OF PRESENT ILLNESS
[FreeTextEntry1] : Dear Dr. Covington \par \par Thank you so much for the referral to help care for your patient.\par \par Chief Complaint: Elevated PSA \par Date of first visit: 07/14/2020\par \par ELLIOT HOBSON  is a 74 year old , with hx of elevated PSA and BPH, who presents to discuss TP MR fusion prostate biopsy.\par \par Patient has been seen by his urologist on 05/05/20 and discussed elevated PSA. He was asked to proceed with MRI, which he had. Denies family hx of PCa.\par \par PSA most recent 7.90 on 05/05/2020. Prior PSA: 7.51 in 12/19, 7.31 in 08/19, 6.47 in 01/19, 6.43 in 06/18.\par \par MRI ([facility]) read 06/26/2020. 79cc, PSAD 0.10, Lesion #1 PIRADS 5 lesion measuring 24 mm in posteromedial PZ. Lesion #2 PIRADS 4 lesion measuring 5 mm in the right posterolateral PZ. No LAD. No EPE. No Bony Lesions. The images have been reviewed and clinical implications discussed with the patient.\par \par Prostate cancer screening: the patient and I spoke at length about prostate cancer screening, its risks and its benefits. The patient has (Age, PSA, Fam Hx) 3 risk factors for prostate cancer.  He understands that many men with prostate cancer will die with the disease rather than of it and we also discussed the results large multi-center American and  prostate cancer screening trials. He also understands that PSA in and of itself does not diagnose prostate cancer but only assesses risk to a certain degree. The patient understands that to definitively screen for prostate cancer, a biopsy is required and this procedure has risks, including bleeding, infection, ED and urinary retention. The patient opted to TP bx .\par \par \par 07/14/2020 \par IPSS 5 QOL 1\par AMADA 2 \par \par The patient denies fevers, chills, nausea and or vomiting and no unexplained weight loss.\par \par All pertinent laboratory, films and physician notes were reviewed.  Questionnaire results were discussed with patient.\par \par I spent 31 minutes of non-face to face time reviewing the patient's records, chart, uploading processing MR images, transferring MR images from PACS to an independent workstation, reviewing images on independent workstation, speaking with their physician and planning their prostate biopsy and preparation of an upcoming visit for 07/14/2020 .  The imaging and planning was highly complex and took this entire time. \par \par

## 2020-07-14 NOTE — ASSESSMENT
[FreeTextEntry1] : 74 year old gentleman with a PSA 7.9 and a recent MRI with two lesions, neg MIAN and brother with hx 66 yo brother dx with orgac confined dz.  The patient has been given options regarding the findings and i do feel a prostate biopsy would be the next step.\par \par 1. TP Bx\par 2. labs\par 3. follow up with Dr. Covington\par \par Thank you very much for allowing me to assist in the care of this patient. Should you have any additional questions or concerns please do not hesitate to contact me.\par \par \par Sincerely,\par \par \par Colin Yuan D.O.\par  of Urology and Radiology\par  of Urology at [facility]\par System Director for Prostate Cancer\par [facility] [address] [place] [address], [address]\par Phone: [phone]

## 2020-07-15 LAB
ANION GAP SERPL CALC-SCNC: 18 MMOL/L
APPEARANCE: CLEAR
BACTERIA: NEGATIVE
BASOPHILS # BLD AUTO: 0.04 K/UL
BASOPHILS NFR BLD AUTO: 0.5 %
BILIRUBIN URINE: NEGATIVE
BLOOD URINE: NEGATIVE
BUN SERPL-MCNC: 40 MG/DL
CALCIUM SERPL-MCNC: 10 MG/DL
CHLORIDE SERPL-SCNC: 104 MMOL/L
CO2 SERPL-SCNC: 19 MMOL/L
COLOR: NORMAL
CREAT SERPL-MCNC: 1.87 MG/DL
EOSINOPHIL # BLD AUTO: 0.11 K/UL
EOSINOPHIL NFR BLD AUTO: 1.4 %
GLUCOSE QUALITATIVE U: NORMAL
GLUCOSE SERPL-MCNC: 81 MG/DL
HCT VFR BLD CALC: 46.2 %
HGB BLD-MCNC: 14.5 G/DL
HYALINE CASTS: 0 /LPF
IMM GRANULOCYTES NFR BLD AUTO: 0.2 %
KETONES URINE: NEGATIVE
LEUKOCYTE ESTERASE URINE: NEGATIVE
LYMPHOCYTES # BLD AUTO: 2.28 K/UL
LYMPHOCYTES NFR BLD AUTO: 28.4 %
MAN DIFF?: NORMAL
MCHC RBC-ENTMCNC: 27.6 PG
MCHC RBC-ENTMCNC: 31.4 GM/DL
MCV RBC AUTO: 88 FL
MICROSCOPIC-UA: NORMAL
MONOCYTES # BLD AUTO: 0.76 K/UL
MONOCYTES NFR BLD AUTO: 9.5 %
NEUTROPHILS # BLD AUTO: 4.81 K/UL
NEUTROPHILS NFR BLD AUTO: 60 %
NITRITE URINE: NEGATIVE
PH URINE: 5.5
PLATELET # BLD AUTO: 197 K/UL
POTASSIUM SERPL-SCNC: 4.8 MMOL/L
PROTEIN URINE: NORMAL
PSA FREE FLD-MCNC: 39 %
PSA FREE SERPL-MCNC: 3.29 NG/ML
PSA SERPL-MCNC: 8.43 NG/ML
RBC # BLD: 5.25 M/UL
RBC # FLD: 15.1 %
RED BLOOD CELLS URINE: 2 /HPF
SODIUM SERPL-SCNC: 141 MMOL/L
SPECIFIC GRAVITY URINE: 1.02
SQUAMOUS EPITHELIAL CELLS: 0 /HPF
UROBILINOGEN URINE: NORMAL
WBC # FLD AUTO: 8.02 K/UL
WHITE BLOOD CELLS URINE: 1 /HPF

## 2020-07-16 LAB — BACTERIA UR CULT: NORMAL

## 2020-07-23 ENCOUNTER — RX RENEWAL (OUTPATIENT)
Age: 75
End: 2020-07-23

## 2020-09-03 ENCOUNTER — APPOINTMENT (OUTPATIENT)
Dept: NEPHROLOGY | Facility: CLINIC | Age: 75
End: 2020-09-03
Payer: MEDICARE

## 2020-09-03 VITALS — TEMPERATURE: 99.1 F | BODY MASS INDEX: 27.99 KG/M2 | WEIGHT: 173.4 LBS

## 2020-09-03 VITALS — DIASTOLIC BLOOD PRESSURE: 65 MMHG | SYSTOLIC BLOOD PRESSURE: 141 MMHG | HEART RATE: 50 BPM

## 2020-09-03 DIAGNOSIS — Z95.828 PRESENCE OF OTHER VASCULAR IMPLANTS AND GRAFTS: ICD-10-CM

## 2020-09-03 DIAGNOSIS — Z01.818 ENCOUNTER FOR OTHER PREPROCEDURAL EXAMINATION: ICD-10-CM

## 2020-09-03 PROCEDURE — 93000 ELECTROCARDIOGRAM COMPLETE: CPT

## 2020-09-03 PROCEDURE — 99214 OFFICE O/P EST MOD 30 MIN: CPT | Mod: 25

## 2020-09-03 PROCEDURE — 36415 COLL VENOUS BLD VENIPUNCTURE: CPT

## 2020-09-03 RX ORDER — TORSEMIDE 10 MG/1
10 TABLET ORAL
Qty: 90 | Refills: 3 | Status: DISCONTINUED | COMMUNITY
Start: 2018-06-18 | End: 2020-09-03

## 2020-09-03 RX ORDER — SODIUM BICARBONATE 650 MG/1
650 TABLET ORAL
Qty: 360 | Refills: 3 | Status: DISCONTINUED | COMMUNITY
Start: 2019-01-29 | End: 2020-09-03

## 2020-09-07 DIAGNOSIS — Z01.818 ENCOUNTER FOR OTHER PREPROCEDURAL EXAMINATION: ICD-10-CM

## 2020-09-08 ENCOUNTER — APPOINTMENT (OUTPATIENT)
Dept: DISASTER EMERGENCY | Facility: CLINIC | Age: 75
End: 2020-09-08

## 2020-09-08 LAB — SARS-COV-2 N GENE NPH QL NAA+PROBE: NOT DETECTED

## 2020-09-09 ENCOUNTER — TRANSCRIPTION ENCOUNTER (OUTPATIENT)
Age: 75
End: 2020-09-09

## 2020-09-09 NOTE — ASSESSMENT
[FreeTextEntry1] : # Preop evaluation. \par * MAY PROCEED WITH PROSTATE BIOPSY UNDER GENERAL ANESTHESIA. PATIENT IS MEDICALLY OPTIMIZED FOR SURGERY.\par * Check EKG: sinus bradycardia due to exercise. \par * No indication for PT/PTT or urine culture currently (discussed with Dr. Yuan).\par \par # HTN controlled at home.\par * White coat effect in office.\par * He declines any increase in BP meds.\par * The patient's blood pressure was checked with the [employer] [id number] using the SPRINT trial protocol after sitting quietly in an empty room with arm supported, back supported, and feet on the floor for 5 minutes. The average of 3 readings were taken.\par * A counseling information sheet has been given (currently or previously, in-person or electronically). All their questions were answered.\par * The patient has been counseled to check their BP at home with an automatic arm cuff, write down the readings, and reach me directly on the phone immediately if they are persistently > 180 systolic or if SBP is less than 100 or if lightheadedness develops. They were counseled to bring in all blood pressure readings and medications next visit.\par * The patient has been counseled that they have a a significant medical condition and regular office followup (at least every 4 months for now) is essential for monitoring, and that it is their responsibility to make follow up appointments.\par * The patient also has been counseled that they must never stop or change any medications without discussing this with me (or another physician). \par \par # CKD stage 3.\par * Recheck labs.\par * A point of care renal ultrasound using the Butterfly iQ was performed and the images were personally reviewed. (The patient understands that this was a brief study to evaluate for hydronephrosis and not a complete renal ultrasound.) The ultrasound showed small right kidney, no significant hydronephrosis. \par * The patient has been counseled that chronic kidney disease is a significant condition and regular office followup with me (at least every 4 months for now) is essential for monitoring, and that it is their responsibility to make a follow up appointment.\par * A counseling information sheet has been given (currently or previously, in-person or electronically). All their questions were answered.\par * The patient has been counseled never to stop taking their medications without discussing it with me or another doctor.\par * The patient has been counseled on risk of acute renal failure and instructed to immediately call and speak with me or go immediately to ER with any severe symptoms, nausea, vomiting, diarrhea, chest pain, or shortness of breath.\par * The patient has been counseled on avoiding NSAIDs.\par \par # Hyperkalemia.\par * Low K diet.\par \par # Kidney stones.\par * High fluid intake.

## 2020-09-09 NOTE — HISTORY OF PRESENT ILLNESS
[FreeTextEntry1] : Referred by Dr. Lezama for CKD. PCP: Dr. Paul Guzman. \par \par * Scheduled for prostate biopsy under MAC with Dr. Hinojosa September 10th. . * CKD stable, [id number] creatinine 1.87. * Hyperkalemia improved. * DM controlled. * 300 mg proteinuria. * No recent kidney stones. * HTN controlled at home, SBP 110s-120/70. He stopped torsemide and sodium bicarbonate. * No chest pain or SOB with 10 minutes+ of aerobic exercise daily. \par \par Previous history (04Jun20): * HTN controlled, BP 120s/70s. No lightheadedness. * CKD stable, creatinine 1.87. * He is following up with urologist for elevated PSA. * K increased to 5.5. He has been eating bananas (despite being counseled not to) and will now stop. * DM controlled, HGA1c 6.6. * No kidney stone for 4 years. Attempting to drink 2-3 liters daily. \par \par Previous history (18Dec19): * He is following up with urologist for elevated PSA. * CKD relatively stable, creatinine up to 1.85. * HTN controlled. Average /70s at home No lightheadedness. Compliant with medications. DM controlled, HGA1c 6.5. * Albuminuria 132 mg/g. \par \par Previous history (25Sep19): * Elevated PSA to 7.31. He is following up closely with urologist. * CKD stable, creatinine 1.85. * HTN controlled. No lightheadedness. Compliant with medications. * Slight rhinitis. \par \par Previous history (20Aug19): * CKD stable, creatinine 1.83. * BP 130s at home. He decreased diuretic to every other day. * Urine protein decreased to 300 mg/g. \par \par Previous history (07May19): * CKD stable, creatinine 1.89 last visit. * HTN previously uncontrolled but has decreased to 120/70s at home on lisinopril. *  Urine protein decreased to 400 mg/g. * \par \par Previous history (02Apr19): * CKD stable, creatinine 1.84. * DM uncontrolled. HGA1c 7.4. \par \par Previous history ([id number]): * HTN borderline controlled. /80s  at home. No lightheadedness. Compliant with medications. Following low salt diet.  * In 2016 he had stenting of left renal artery. * He requires omeprazole for severe GERD and ranitidine has been ineffective. * Urine albumin 131 mg/g.* CKD stable, creatinine 1.84.  * He is following up closely with urologist for elevated PSA. * DM controlled, HGA1c 6.8. *  24 hour urine on 2/18 showed low urine citrate. His last kidney stone was in 2016. 11/17 US did not show nephrolithiasis. He was unable to tolerate potassium citrate due to GERD. \par \par

## 2020-09-09 NOTE — PHYSICAL EXAM
[General Appearance - Alert] : alert [General Appearance - In No Acute Distress] : in no acute distress [Sclera] : the sclera and conjunctiva were normal [Extraocular Movements] : extraocular movements were intact [Outer Ear] : the ears and nose were normal in appearance [PERRL With Normal Accommodation] : pupils were equal in size, round, and reactive to light [Oropharynx] : the oropharynx was normal [Neck Appearance] : the appearance of the neck was normal [Thyroid Diffuse Enlargement] : the thyroid was not enlarged [Neck Cervical Mass (___cm)] : no neck mass was observed [Jugular Venous Distention Increased] : there was no jugular-venous distention [Thyroid Nodule] : there were no palpable thyroid nodules [Auscultation Breath Sounds / Voice Sounds] : lungs were clear to auscultation bilaterally [Heart Sounds] : normal S1 and S2 [Heart Rate And Rhythm] : heart rate was normal and rhythm regular [Heart Sounds Pericardial Friction Rub] : no pericardial rub [Heart Sounds Gallop] : no gallops [Murmurs] : no murmurs [Veins - Varicosity Changes] : there were no varicosital changes [Bowel Sounds] : normal bowel sounds [Abdomen Tenderness] : non-tender [Abdomen Soft] : soft [Abdomen Mass (___ Cm)] : no abdominal mass palpated [Cervical Lymph Nodes Enlarged Anterior Bilaterally] : anterior cervical [Cervical Lymph Nodes Enlarged Posterior Bilaterally] : posterior cervical [Supraclavicular Lymph Nodes Enlarged Bilaterally] : supraclavicular [Abnormal Walk] : normal gait [Nail Clubbing] : no clubbing  or cyanosis of the fingernails [Musculoskeletal - Swelling] : no joint swelling seen [Motor Tone] : muscle strength and tone were normal [Skin Turgor] : normal skin turgor [Skin Color & Pigmentation] : normal skin color and pigmentation [] : no rash [Oriented To Time, Place, And Person] : oriented to person, place, and time [Affect] : the affect was normal [Impaired Insight] : insight and judgment were intact [FreeTextEntry1] : pitting bilateral ankle edema

## 2020-09-10 ENCOUNTER — RESULT REVIEW (OUTPATIENT)
Age: 75
End: 2020-09-10

## 2020-09-10 ENCOUNTER — APPOINTMENT (OUTPATIENT)
Dept: UROLOGY | Facility: AMBULATORY SURGERY CENTER | Age: 75
End: 2020-09-10

## 2020-09-10 ENCOUNTER — OUTPATIENT (OUTPATIENT)
Dept: OUTPATIENT SERVICES | Facility: HOSPITAL | Age: 75
LOS: 1 days | Discharge: HOME | End: 2020-09-10
Payer: MEDICARE

## 2020-09-10 LAB — GLUCOSE BLDC GLUCOMTR-MCNC: 122 MG/DL — HIGH (ref 70–99)

## 2020-09-10 PROCEDURE — 55706 BX PRST8 NDL SAT SAMPLING: CPT

## 2020-09-10 PROCEDURE — 88344 IMHCHEM/IMCYTCHM EA MLT ANTB: CPT | Mod: 26

## 2020-09-10 PROCEDURE — 76942 ECHO GUIDE FOR BIOPSY: CPT | Mod: 26,59

## 2020-09-10 PROCEDURE — 76872 US TRANSRECTAL: CPT | Mod: 26

## 2020-09-10 PROCEDURE — 88305 TISSUE EXAM BY PATHOLOGIST: CPT | Mod: 26

## 2020-09-15 LAB — SURGICAL PATHOLOGY STUDY: SIGNIFICANT CHANGE UP

## 2020-09-19 LAB
25(OH)D3 SERPL-MCNC: 81.9 NG/ML
ALBUMIN SERPL ELPH-MCNC: 5 G/DL
ALP BLD-CCNC: 61 U/L
ALT SERPL-CCNC: 26 U/L
ANION GAP SERPL CALC-SCNC: 15 MMOL/L
APPEARANCE: CLEAR
AST SERPL-CCNC: 23 U/L
BASOPHILS # BLD AUTO: 0.04 K/UL
BASOPHILS NFR BLD AUTO: 0.5 %
BILIRUB SERPL-MCNC: 0.8 MG/DL
BILIRUBIN URINE: NEGATIVE
BLOOD URINE: NEGATIVE
BUN SERPL-MCNC: 46 MG/DL
CALCIUM SERPL-MCNC: 10.2 MG/DL
CALCIUM SERPL-MCNC: 10.2 MG/DL
CHLORIDE SERPL-SCNC: 103 MMOL/L
CO2 SERPL-SCNC: 23 MMOL/L
COLOR: COLORLESS
CREAT SERPL-MCNC: 1.85 MG/DL
CREAT SPEC-SCNC: 28 MG/DL
CREAT SPEC-SCNC: 28 MG/DL
CREAT/PROT UR: 0.3 RATIO
CYSTATIN C SERPL-MCNC: 1.88 MG/L
EOSINOPHIL # BLD AUTO: 0.1 K/UL
EOSINOPHIL NFR BLD AUTO: 1.3 %
GFR/BSA.PRED SERPLBLD CYS-BASED-ARV: 32 ML/MIN
GLUCOSE QUALITATIVE U: NEGATIVE
GLUCOSE SERPL-MCNC: 120 MG/DL
HCT VFR BLD CALC: 46.4 %
HGB BLD-MCNC: 14.4 G/DL
IMM GRANULOCYTES NFR BLD AUTO: 0.5 %
KETONES URINE: NEGATIVE
LEUKOCYTE ESTERASE URINE: NEGATIVE
LYMPHOCYTES # BLD AUTO: 1.7 K/UL
LYMPHOCYTES NFR BLD AUTO: 22.4 %
MAGNESIUM SERPL-MCNC: 2.5 MG/DL
MAN DIFF?: NORMAL
MCHC RBC-ENTMCNC: 27.6 PG
MCHC RBC-ENTMCNC: 31 GM/DL
MCV RBC AUTO: 88.9 FL
MICROALBUMIN 24H UR DL<=1MG/L-MCNC: 3.2 MG/DL
MICROALBUMIN/CREAT 24H UR-RTO: 115 MG/G
MONOCYTES # BLD AUTO: 0.83 K/UL
MONOCYTES NFR BLD AUTO: 10.9 %
NEUTROPHILS # BLD AUTO: 4.88 K/UL
NEUTROPHILS NFR BLD AUTO: 64.4 %
NITRITE URINE: NEGATIVE
PARATHYROID HORMONE INTACT: 43 PG/ML
PH URINE: 5
PHOSPHATE SERPL-MCNC: 3.6 MG/DL
PLATELET # BLD AUTO: 199 K/UL
POTASSIUM SERPL-SCNC: 5 MMOL/L
PROT SERPL-MCNC: 7.4 G/DL
PROT UR-MCNC: 8 MG/DL
PROTEIN URINE: NEGATIVE
RBC # BLD: 5.22 M/UL
RBC # FLD: 15.7 %
SODIUM SERPL-SCNC: 141 MMOL/L
SPECIFIC GRAVITY URINE: 1.01
TSH SERPL-ACNC: 0.84 UIU/ML
UROBILINOGEN URINE: NORMAL
VIT B12 SERPL-MCNC: 895 PG/ML
WBC # FLD AUTO: 7.59 K/UL

## 2020-11-06 ENCOUNTER — OUTPATIENT (OUTPATIENT)
Dept: OUTPATIENT SERVICES | Facility: HOSPITAL | Age: 75
LOS: 1 days | Discharge: HOME | End: 2020-11-06
Payer: MEDICARE

## 2020-11-09 ENCOUNTER — APPOINTMENT (OUTPATIENT)
Dept: RADIATION ONCOLOGY | Facility: CLINIC | Age: 75
End: 2020-11-09
Payer: MEDICARE

## 2020-11-09 PROCEDURE — 99205 OFFICE O/P NEW HI 60 MIN: CPT | Mod: 95

## 2020-11-10 PROCEDURE — 77263 THER RADIOLOGY TX PLNG CPLX: CPT

## 2020-11-10 NOTE — PHYSICAL EXAM
[] : no respiratory distress [Normal] : well developed, well nourished, in no acute distress [de-identified] : telehealth visit.  [de-identified] : Speaking full sentances.

## 2020-11-10 NOTE — HISTORY OF PRESENT ILLNESS
[Home] : at home, [unfilled] , at the time of the visit. [Medical Office: ([facility])___] : at the medical office located in  [Spouse] : spouse [Verbal consent obtained from patient] : the patient, [unfilled] [FreeTextEntry1] : Mr. Pruitt is a  75 years old male with history of AAA s/p repair in 2017, renal insufficiency, right renal artery stenosis, ureterolithotripsy, retinal detachment surgery and now with high risk prostate cancer, Joey 4+4=8 and pretreatment PSA of 8.43. He was referred by Dr. Yuan for discussion of radiation therapy.\par \par Brief Oncological History:\par PSA Trend - ng/mL\par 7/15/20  -  8.43\par 5/5/20    -  7.90\par 12/2019 -  7.51\par 8/2019   -  7.31\par 1/2019   -  6.47\par 6/2018   -  6.43\par \par 6/26/20 - MRI  showed prostate volume of 79 cc.  A 24 x 9 x 23 mm lesion in the posteromedial peripheral zone PIRADS 5. A 5 x 4 x 4 mm lesion in the right posterolateral peripheral zone at the junction of the mid gland and apex. PIRADS 4. No RUTH and no metastatic disease within the pelvis. Enlarged prostate with BPH was noted.\par \par 9/10/20 - Underwent prostate biopsy with MRI guidanced. Pathology report demonstrated 5 out of 14 cores positive for cancer with Joey score 4+4=8 involving 90% of one core, with perineural invasion present. This core was nearby the MRI guided cores biopsied which showed GS 4+3=7. Overall, 5% - 100% of the cores positive and perineural invasion was identified.\par \par Patient presents today to discuss his radiation therapy option. Family history with brother having prostate cancer. He report nocturia 1-2 times per night, and occasional urgency.  He denies any other urinary or bowel issues. Has regular daily bowel function and he is sexually active. He had a bone scan done at [facility] per patient, negative for bone mets. Received his first hormone injection on 10/15/20 (monthly) with Ambrocio Osuna.

## 2020-11-10 NOTE — DISEASE MANAGEMENT
[0-10] : 0 -10 ng/mL [8] : Madison Score 8 [] : Patient had a Prostate MRI [5] : 5 [IIIB] : IIIB [1] : T1 [c] : c [0] : M0 [BiopsyDate] : 9/10/20 [MeasuredProstateVolume] : 79 cc [TotalCores] : 14 [TotalPositiveCores] : 5 [MaxCoreInvolvement] : 100%

## 2020-11-10 NOTE — REVIEW OF SYSTEMS
[Nocturia] : nocturia [Negative] : Genitourinary [Hematuria: Grade 0] : Hematuria: Grade 0 [Urinary Incontinence: Grade 0] : Urinary Incontinence: Grade 0  [Urinary Retention: Grade 0] : Urinary Retention: Grade 0 [Urinary Tract Pain: Grade 0] : Urinary Tract Pain: Grade 0 [Urinary Urgency: Grade 0] : Urinary Urgency: Grade 0 [Urinary Frequency: Grade 0] : Urinary Frequency: Grade 0 [Ejaculation Disorder: Grade 0] : Ejaculation Disorder: Grade 0 [Erectile Dysfunction: Grade 0] : Erectile Dysfunction: Grade 0 [Urinary Frequency] : no urinary frequency

## 2020-11-25 PROCEDURE — 77470 SPECIAL RADIATION TREATMENT: CPT | Mod: 26

## 2020-11-30 ENCOUNTER — NON-APPOINTMENT (OUTPATIENT)
Age: 75
End: 2020-11-30

## 2020-12-01 ENCOUNTER — NON-APPOINTMENT (OUTPATIENT)
Age: 75
End: 2020-12-01

## 2020-12-01 PROCEDURE — 55876 PLACE RT DEVICE/MARKER PROS: CPT

## 2020-12-01 PROCEDURE — 76872 US TRANSRECTAL: CPT | Mod: 26

## 2020-12-01 PROCEDURE — 55874 TPRNL PLMT BIODEGRDABL MATRL: CPT

## 2020-12-03 NOTE — PROCEDURE
[FreeTextEntry1] : transperineal insertion of space OAr Hydrogel with fiducial markers placement [FreeTextEntry2] : as part of the  treatment planning for prostate cancer treatment  [FreeTextEntry3] : In preparation for the procedure, he self-administered an enema one hour before leaving home and was NPO the night before procedure. He was prescribed a 3 days course of oral antibiotics twice daily to be started a day prior to the procedure. Tropical DOREEN cream was applied to the perineal area one hour prior to procedure. Patient was prescribed and took Valium 5mg and Tylenol 650 mg upon arrival in the department one hour to procedure. Procedure risk and benefits were reviewed with patient and a written consent was obtained prior to procedure. A time out was observed with patient name, date of birth, procedure, position, and site verified. \par \par Patient was placed in a lithotomy position. Chloral prep was used to prep the skin. While maintaining aseptic technique, an ultrasound probe was inserted into the rectum to visualize the prostate. Less than 10 cc of Lidocaine 2% and sodium bicarbonate 8.4% was injected subcutaneously. Afterwards, 20 cc of Lidocaine and sodium bicarbonate was injected internally at the prostate apex and bilateral neurovascular bundles for the nerve block.\par  Three Morgan markers were prepared on the sterile field. One Morgan  marker was placed into each of the following sites: left lobe base, right lobe base, and apex, via 14 gauge needles under ultrasound guidance. \par  \par \par Next, the hydrogel spacer kit was opened onto the sterile field and the hydrogel injection apparatus was prepared. An 18 gauge needle was positioned into the mid-line perirectal fat between the anterior rectal wall and prostate under ultrasound guidance. Less than 10 cc of saline was injected via the needle to hydrodissect the space and confirm proper placement in both axial and sagittal views. The syringe was aspirated to confirm the needle was extravascular. The syringe was replaced with the hydrogel injection apparatus and the gel was injected over about 10 seconds. The needle was then removed. There was minimal blood loss. The patient tolerated procedure well.\par Patient was transferred to the recovery area on a monitor. Vital signs were stable. He tolerated fluid and a snack by mouth and was made comfortable. He denied pain. Post procedure instruction were given and reviewed with patient. CT/SIM appointment was given. He was discharged home in a stable condition, accompanied by his wife\par

## 2020-12-10 ENCOUNTER — APPOINTMENT (OUTPATIENT)
Dept: MRI IMAGING | Facility: IMAGING CENTER | Age: 75
End: 2020-12-10

## 2020-12-10 ENCOUNTER — NON-APPOINTMENT (OUTPATIENT)
Age: 75
End: 2020-12-10

## 2020-12-17 PROCEDURE — 77300 RADIATION THERAPY DOSE PLAN: CPT | Mod: 26

## 2020-12-17 PROCEDURE — 77338 DESIGN MLC DEVICE FOR IMRT: CPT | Mod: 26

## 2020-12-17 PROCEDURE — 77301 RADIOTHERAPY DOSE PLAN IMRT: CPT | Mod: 26

## 2020-12-18 ENCOUNTER — APPOINTMENT (OUTPATIENT)
Dept: NEPHROLOGY | Facility: CLINIC | Age: 75
End: 2020-12-18
Payer: MEDICARE

## 2020-12-18 ENCOUNTER — LABORATORY RESULT (OUTPATIENT)
Age: 75
End: 2020-12-18

## 2020-12-18 VITALS — HEART RATE: 74 BPM | DIASTOLIC BLOOD PRESSURE: 78 MMHG | SYSTOLIC BLOOD PRESSURE: 134 MMHG

## 2020-12-18 DIAGNOSIS — R68.89 OTHER GENERAL SYMPTOMS AND SIGNS: ICD-10-CM

## 2020-12-18 DIAGNOSIS — E83.50 UNSPECIFIED DISORDER OF CALCIUM METABOLISM: ICD-10-CM

## 2020-12-18 DIAGNOSIS — Z23 ENCOUNTER FOR IMMUNIZATION: ICD-10-CM

## 2020-12-18 DIAGNOSIS — Z79.899 OTHER LONG TERM (CURRENT) DRUG THERAPY: ICD-10-CM

## 2020-12-18 PROCEDURE — 99214 OFFICE O/P EST MOD 30 MIN: CPT

## 2020-12-18 PROCEDURE — 90732 PPSV23 VACC 2 YRS+ SUBQ/IM: CPT

## 2020-12-18 PROCEDURE — G0009: CPT

## 2020-12-18 RX ORDER — AMOXICILLIN AND CLAVULANATE POTASSIUM 875; 125 MG/1; MG/1
875-125 TABLET, COATED ORAL
Qty: 6 | Refills: 0 | Status: DISCONTINUED | COMMUNITY
Start: 2020-11-09 | End: 2020-12-18

## 2020-12-18 NOTE — HISTORY OF PRESENT ILLNESS
[FreeTextEntry1] : Referred by Dr. Lezama for CKD.\par \par * Dx with prostate cancer. Hormonal treatment started. * CKD stable, creatinine 1.85 in September 2020. US 2018 no stones.  * Hyperkalemia improved. * No recent kidney stones. * 115 mg albuminuria.  * HTN borderline controlled.  \par \par Previous history (03Sep20): * Scheduled for prostate biopsy under MAC with Dr. Hinojosa September 10th. . * CKD stable, [id number] creatinine 1.87. * Hyperkalemia improved. * DM controlled. * 300 mg proteinuria. * No recent kidney stones. * HTN controlled at home, SBP 110s-120/70. He stopped torsemide and sodium bicarbonate. * No chest pain or SOB with 10 minutes+ of aerobic exercise daily. \par \par Previous history (04Jun20): * HTN controlled, BP 120s/70s. No lightheadedness. * CKD stable, creatinine 1.87. * He is following up with urologist for elevated PSA. * K increased to 5.5. He has been eating bananas (despite being counseled not to) and will now stop. * DM controlled, HGA1c 6.6. * No kidney stone for 4 years. Attempting to drink 2-3 liters daily. \par \par Previous history (18Dec19): * He is following up with urologist for elevated PSA. * CKD relatively stable, creatinine up to 1.85. * HTN controlled. Average /70s at home No lightheadedness. Compliant with medications. DM controlled, HGA1c 6.5. * Albuminuria 132 mg/g. \par \par Previous history (25Sep19): * Elevated PSA to 7.31. He is following up closely with urologist. * CKD stable, creatinine 1.85. * HTN controlled. No lightheadedness. Compliant with medications. * Slight rhinitis. \par \par Previous history (20Aug19): * CKD stable, creatinine 1.83. * BP 130s at home. He decreased diuretic to every other day. * Urine protein decreased to 300 mg/g. \par \par Previous history (07May19): * CKD stable, creatinine 1.89 last visit. * HTN previously uncontrolled but has decreased to 120/70s at home on lisinopril. *  Urine protein decreased to 400 mg/g. * \par \par Previous history (02Apr19): * CKD stable, creatinine 1.84. * DM uncontrolled. HGA1c 7.4. \par \par Previous history (29Jan19): * HTN borderline controlled. /80s  at home. No lightheadedness. Compliant with medications. Following low salt diet.  * In 2016 he had stenting of left renal artery. * He requires omeprazole for severe GERD and ranitidine has been ineffective. * Urine albumin 131 mg/g.* CKD stable, creatinine 1.84.  * He is following up closely with urologist for elevated PSA. * DM controlled, HGA1c 6.8. *  24 hour urine on 2/18 showed low urine citrate. His last kidney stone was in 2016. 11/17 US did not show nephrolithiasis. He was unable to tolerate potassium citrate due to GERD. \par \par Options for clinical preventative services\par \par Influenza: september 2020\par Pneumonia:  pneumovax [id number] \par Shingles:\par TDAP:\par Colonoscopy: March 2020\par PSA: by urologist \par Dermatologist: Advised to see yearly\par HIV:\par Hep C:\par \par \par

## 2020-12-18 NOTE — ASSESSMENT
[FreeTextEntry1] : # CKD stage 3.\par * Therapy with SGLT2 inhibitor discussed. They do not have severe peripheral neuropathy, or diabetic foot ulcers. Benefits, alternatives, and risks to medication including but not limited to amputations, severe infections, urinary or genital infections, acid in the blood, dehydration, and acute kidney injury discussed and they consent. They will go immediately to the ER with any unusual or severe symptoms or symptoms of acid in the blood (like nausea, vomiting, malaise, abdominal pain, etc.). I counseled that they should care for their feet and evaluate their feet for any signs of infection or ulcers daily and inform a doctor immediately with any evidence of infection. They will contact me immediately with any pain or fever. I discussed that we are not using this medication primarily for diabetic control, but rather to help protect the kidneys and heart. UpToDate patient information on medication electronically provided. All their questions were answered.\par * * The patient has been counseled that chronic kidney disease is a significant condition and regular office followup with me (at least every 1 month for now) is essential for monitoring, and that it is their responsibility to make a follow up appointment.\par * A counseling information sheet has been given (currently or previously, in-person or electronically). All their questions were answered.\par * The patient has been counseled never to stop taking their medications without discussing it with me or another doctor.\par * The patient has been counseled on risk of acute renal failure and instructed to immediately call and speak with me or go immediately to ER with any severe symptoms, nausea, vomiting, diarrhea, chest pain, or shortness of breath.\par * The patient has been counseled on avoiding NSAIDs.\par \par # HTN borderline controlled.\par * Follow up on Farxiga.\par * The patient's blood pressure was checked with the [employer] [id number] using the SPRINT trial protocol after sitting quietly in an empty room with arm supported, back supported, and feet on the floor for 5 minutes. The average of 3 readings were taken.\par * A counseling information sheet has been given (currently or previously, in-person or electronically). All their questions were answered.\par * The patient has been counseled to check their BP at home with an automatic arm cuff, write down the readings, and reach me directly on the phone immediately if they are persistently > 180 systolic or if SBP is less than 100 or if lightheadedness develops. They were counseled to bring in all blood pressure readings and medications next visit.\par * The patient has been counseled that they have a a significant medical condition and regular office followup (at least every 1 months for now) is essential for monitoring, and that it is their responsibility to make follow up appointments.\par * The patient also has been counseled that they must never stop or change any medications without discussing this with me (or another physician). \par \par # Hyperkalemia.\par * Low K diet.\par \par # Kidney stones.\par * Maintain high fluid intake.\par * Intermittently check renal ultrasound. \par

## 2020-12-25 LAB
25(OH)D3 SERPL-MCNC: 94.4 NG/ML
ALBUMIN SERPL ELPH-MCNC: 4.8 G/DL
ALP BLD-CCNC: 73 U/L
ALT SERPL-CCNC: 25 U/L
ANION GAP SERPL CALC-SCNC: 14 MMOL/L
APPEARANCE: CLEAR
AST SERPL-CCNC: 26 U/L
BASOPHILS # BLD AUTO: 0.05 K/UL
BASOPHILS NFR BLD AUTO: 0.7 %
BILIRUB SERPL-MCNC: 0.7 MG/DL
BILIRUBIN URINE: NEGATIVE
BLOOD URINE: ABNORMAL
BUN SERPL-MCNC: 43 MG/DL
CALCIUM SERPL-MCNC: 10.5 MG/DL
CALCIUM SERPL-MCNC: 10.5 MG/DL
CHLORIDE SERPL-SCNC: 103 MMOL/L
CHOLEST SERPL-MCNC: 194 MG/DL
CO2 SERPL-SCNC: 24 MMOL/L
COLOR: NORMAL
CREAT SERPL-MCNC: 1.9 MG/DL
CREAT SPEC-SCNC: 37 MG/DL
CREAT SPEC-SCNC: 37 MG/DL
CREAT/PROT UR: 0.3 RATIO
EOSINOPHIL # BLD AUTO: 0.15 K/UL
EOSINOPHIL NFR BLD AUTO: 2 %
ESTIMATED AVERAGE GLUCOSE: 151 MG/DL
FERRITIN SERPL-MCNC: 237 NG/ML
GLUCOSE QUALITATIVE U: NEGATIVE
GLUCOSE SERPL-MCNC: 105 MG/DL
HBA1C MFR BLD HPLC: 6.9 %
HCT VFR BLD CALC: 45.6 %
HDLC SERPL-MCNC: 57 MG/DL
HGB BLD-MCNC: 14.4 G/DL
IMM GRANULOCYTES NFR BLD AUTO: 0.3 %
KETONES URINE: NEGATIVE
LDLC SERPL CALC-MCNC: 96 MG/DL
LEUKOCYTE ESTERASE URINE: NEGATIVE
LYMPHOCYTES # BLD AUTO: 1.91 K/UL
LYMPHOCYTES NFR BLD AUTO: 24.9 %
MAGNESIUM SERPL-MCNC: 2.3 MG/DL
MAN DIFF?: NORMAL
MCHC RBC-ENTMCNC: 27.1 PG
MCHC RBC-ENTMCNC: 31.6 GM/DL
MCV RBC AUTO: 85.9 FL
MICROALBUMIN 24H UR DL<=1MG/L-MCNC: 4.1 MG/DL
MICROALBUMIN/CREAT 24H UR-RTO: 111 MG/G
MONOCYTES # BLD AUTO: 0.79 K/UL
MONOCYTES NFR BLD AUTO: 10.3 %
NEUTROPHILS # BLD AUTO: 4.74 K/UL
NEUTROPHILS NFR BLD AUTO: 61.8 %
NITRITE URINE: NEGATIVE
NONHDLC SERPL-MCNC: 137 MG/DL
PARATHYROID HORMONE INTACT: 34 PG/ML
PH URINE: 5
PHOSPHATE SERPL-MCNC: 3.9 MG/DL
PLATELET # BLD AUTO: 217 K/UL
POTASSIUM SERPL-SCNC: 5.6 MMOL/L
PROT SERPL-MCNC: 7.4 G/DL
PROT UR-MCNC: 10 MG/DL
PROTEIN URINE: NEGATIVE
RBC # BLD: 5.31 M/UL
RBC # FLD: 14.3 %
SODIUM SERPL-SCNC: 141 MMOL/L
SPECIFIC GRAVITY URINE: 1.01
TRIGL SERPL-MCNC: 205 MG/DL
TSH SERPL-ACNC: 0.66 UIU/ML
UROBILINOGEN URINE: NORMAL
VIT B12 SERPL-MCNC: 1072 PG/ML
WBC # FLD AUTO: 7.66 K/UL

## 2021-01-06 PROCEDURE — 77427 RADIATION TX MANAGEMENT X5: CPT

## 2021-01-06 PROCEDURE — 77387B: CUSTOM | Mod: 26

## 2021-01-07 PROCEDURE — 77387B: CUSTOM | Mod: 26

## 2021-01-08 PROCEDURE — 77387B: CUSTOM | Mod: 26

## 2021-01-11 ENCOUNTER — NON-APPOINTMENT (OUTPATIENT)
Age: 76
End: 2021-01-11

## 2021-01-11 PROCEDURE — 77387B: CUSTOM | Mod: 26

## 2021-01-11 NOTE — REVIEW OF SYSTEMS
[Constipation: Grade 0] : Constipation: Grade 0 [Diarrhea: Grade 0] : Diarrhea: Grade 0 [Fatigue: Grade 0] : Fatigue: Grade 0 [Hematuria: Grade 0] : Hematuria: Grade 0 [Urinary Incontinence: Grade 0] : Urinary Incontinence: Grade 0  [Urinary Retention: Grade 0] : Urinary Retention: Grade 0 [Urinary Tract Pain: Grade 0] : Urinary Tract Pain: Grade 0 [Urinary Urgency: Grade 0] : Urinary Urgency: Grade 0 [Urinary Frequency: Grade 0] : Urinary Frequency: Grade 0

## 2021-01-11 NOTE — HISTORY OF PRESENT ILLNESS
[FreeTextEntry1] : Mr. Pruitt is a  75 years old male with history of AAA s/p repair in 2017, renal insufficiency, right renal artery stenosis, ureterolithotripsy, retinal detachment surgery and now with high risk prostate cancer, Joey 4+4=8 and pretreatment PSA of 8.43.  He presents for radiation treatment. He denies any urinary or bowel issue. Completed 720/8100 cGy today.

## 2021-01-12 PROCEDURE — 77387B: CUSTOM | Mod: 26

## 2021-01-13 PROCEDURE — 77387B: CUSTOM | Mod: 26

## 2021-01-13 PROCEDURE — 77427 RADIATION TX MANAGEMENT X5: CPT

## 2021-01-14 PROCEDURE — 77387B: CUSTOM | Mod: 26

## 2021-01-15 PROCEDURE — 77387B: CUSTOM | Mod: 26

## 2021-01-19 ENCOUNTER — NON-APPOINTMENT (OUTPATIENT)
Age: 76
End: 2021-01-19

## 2021-01-19 PROCEDURE — 77387B: CUSTOM | Mod: 26

## 2021-01-19 NOTE — HISTORY OF PRESENT ILLNESS
[FreeTextEntry1] : Mr. Pruitt is a  75 years old male with history of AAA s/p repair in 2017, renal insufficiency, right renal artery stenosis, ureterolithotripsy, retinal detachment surgery and now with high risk prostate cancer, Joey 4+4=8 and pretreatment PSA of 8.43.  He presents for radiation treatment. He report urinary hesitancy and denies any other urinary or bowel issue. Completed 1620/8100 cGy today.

## 2021-01-20 PROCEDURE — 77427 RADIATION TX MANAGEMENT X5: CPT

## 2021-01-20 PROCEDURE — 77387B: CUSTOM | Mod: 26

## 2021-01-21 PROCEDURE — 77427 RADIATION TX MANAGEMENT X5: CPT

## 2021-01-21 PROCEDURE — 77387B: CUSTOM | Mod: 26

## 2021-01-22 PROCEDURE — 77387B: CUSTOM | Mod: 26

## 2021-01-25 ENCOUNTER — APPOINTMENT (OUTPATIENT)
Dept: NEPHROLOGY | Facility: CLINIC | Age: 76
End: 2021-01-25
Payer: MEDICARE

## 2021-01-25 PROCEDURE — 77387B: CUSTOM | Mod: 26

## 2021-01-25 PROCEDURE — 99214 OFFICE O/P EST MOD 30 MIN: CPT | Mod: 95

## 2021-01-25 NOTE — REASON FOR VISIT
[Routine On-Treatment] : a routine on-treatment visit for [Prostate Cancer] : prostate cancer [FreeTextEntry1] : CKD Violeta

## 2021-01-25 NOTE — HISTORY OF PRESENT ILLNESS
[Home] : at home, [unfilled] , at the time of the visit. [Other Location: e.g. Home (Enter Location, City,State)___] : at [unfilled] [Verbal consent obtained from patient] : the patient, [unfilled] [FreeTextEntry1] : Mr. Pruitt is a  75 years old male with history of AAA s/p repair in 2017, renal insufficiency, right renal artery stenosis, ureterolithotripsy, retinal detachment surgery and now with high risk prostate cancer, Joey 4+4=8 and pretreatment PSA of 8.43.  He presents for radiation treatment. He report urinary hesitancy and denies any other urinary or bowel issue. Completed 2340/8100 cGy today.

## 2021-01-25 NOTE — ASSESSMENT
[FreeTextEntry1] : # CKD stage 3.\par * Cont lisinopril and farxiga.\par * Recheck labs.\par * The patient has been counseled that chronic kidney disease is a significant condition and regular office followup with me (at least every 2 months for now) is important for monitoring and their health, and that it is their responsibility to make a follow up appointment.\par * A counseling information sheet has been given (currently or previously, in-person or electronically). All their questions were answered.\par * The patient has been counseled never to stop taking their medications without discussing it with me or another doctor.\par * The patient has been counseled on risk of acute renal failure and instructed to immediately call and speak with me or go immediately to ER with any severe symptoms, nausea, vomiting, diarrhea, chest pain, or shortness of breath.\par * The patient has been counseled on avoiding NSAIDs.\par \par # Hyperkalemia.\par * Recheck K.\par * Low K diet reinforced.\par \par # Kidney stones.\par * Maintain high fluid intake.\par \par # HTN controlled.\par * Cont lisinopril, metoprolol, amlodipine.\par * A counseling information sheet has been given (currently or previously, in-person or electronically). All their questions were answered.\par * The patient has been counseled to check their BP at home with an automatic arm cuff, write down the readings, and reach me directly on the phone immediately if they are persistently > 180 systolic or if SBP is less than 100 or if lightheadedness develops. They were counseled to bring in all blood pressure readings and medications next visit.\par * The patient has been counseled that regular office followup (at least every 2 months for now)  is important for monitoring and for their health, and that it is their responsibility to make follow up appointments.\par * The patient also has been counseled that they must never stop or change any medications without discussing this with me (or another physician). \par \par # Prostate cancer.\par * Follow up with urology. \par \par

## 2021-01-25 NOTE — REASON FOR VISIT
[Routine On-Treatment] : a routine on-treatment visit for [Prostate Cancer] : prostate cancer [FreeTextEntry1] : CKD

## 2021-01-25 NOTE — PHYSICAL EXAM
[Normal] : normoactive bowel sounds, soft and nontender, no hepatosplenomegaly or masses appreciated [General Appearance - Alert] : alert [General Appearance - In No Acute Distress] : in no acute distress

## 2021-01-26 PROCEDURE — 77387B: CUSTOM | Mod: 26

## 2021-01-27 PROCEDURE — 77427 RADIATION TX MANAGEMENT X5: CPT

## 2021-01-27 PROCEDURE — 77387B: CUSTOM | Mod: 26

## 2021-01-28 PROCEDURE — 77387B: CUSTOM | Mod: 26

## 2021-01-29 PROCEDURE — 77387B: CUSTOM | Mod: 26

## 2021-02-02 ENCOUNTER — NON-APPOINTMENT (OUTPATIENT)
Age: 76
End: 2021-02-02

## 2021-02-02 PROCEDURE — 77387B: CUSTOM | Mod: 26

## 2021-02-02 NOTE — HISTORY OF PRESENT ILLNESS
[FreeTextEntry1] : Mr. Pruitt is a  75 years old male with history of AAA s/p repair in 2017, renal insufficiency, right renal artery stenosis, ureterolithotripsy, retinal detachment surgery and now with high risk prostate cancer, Joey 4+4=8 and pretreatment PSA of 8.43.  He presents for radiation treatment. He report urinary hesitancy and denies any other urinary or bowel issue. Completed 3240/8100 cGy today.

## 2021-02-03 PROCEDURE — 77387B: CUSTOM | Mod: 26

## 2021-02-04 PROCEDURE — 77387B: CUSTOM | Mod: 26

## 2021-02-04 PROCEDURE — 77427 RADIATION TX MANAGEMENT X5: CPT

## 2021-02-05 PROCEDURE — 77387B: CUSTOM | Mod: 26

## 2021-02-08 ENCOUNTER — NON-APPOINTMENT (OUTPATIENT)
Age: 76
End: 2021-02-08

## 2021-02-08 PROCEDURE — 77387B: CUSTOM | Mod: 26

## 2021-02-08 NOTE — HISTORY OF PRESENT ILLNESS
[FreeTextEntry1] : Mr. Pruitt is a  75 years old male with history of AAA s/p repair in 2017, renal insufficiency, right renal artery stenosis, ureterolithotripsy, retinal detachment surgery and now with high risk prostate cancer, Joey 4+4=8 and pretreatment PSA of 8.43.  He presents for radiation treatment. He report urinary hesitancy and denies any other urinary or bowel issue. Completed 3960/8100 cGy today.

## 2021-02-08 NOTE — REVIEW OF SYSTEMS
[Constipation: Grade 0] : Constipation: Grade 0 [Diarrhea: Grade 0] : Diarrhea: Grade 0 [Fatigue: Grade 0] : Fatigue: Grade 0 [Hematuria: Grade 0] : Hematuria: Grade 0 [Urinary Incontinence: Grade 0] : Urinary Incontinence: Grade 0  [Urinary Retention: Grade 0] : Urinary Retention: Grade 0 [Urinary Tract Pain: Grade 0] : Urinary Tract Pain: Grade 0 [Urinary Frequency: Grade 0] : Urinary Frequency: Grade 0 [Urinary Urgency: Grade 0] : Urinary Urgency: Grade 0

## 2021-02-09 PROCEDURE — 77387B: CUSTOM | Mod: 26

## 2021-02-10 PROCEDURE — 77387B: CUSTOM | Mod: 26

## 2021-02-11 PROCEDURE — 77387B: CUSTOM | Mod: 26

## 2021-02-11 PROCEDURE — 77427 RADIATION TX MANAGEMENT X5: CPT

## 2021-02-16 ENCOUNTER — NON-APPOINTMENT (OUTPATIENT)
Age: 76
End: 2021-02-16

## 2021-02-16 PROCEDURE — 77387C: CUSTOM

## 2021-02-16 NOTE — HISTORY OF PRESENT ILLNESS
[FreeTextEntry1] : Mr. Pruitt is a  75 years old male with history of AAA s/p repair in 2017, renal insufficiency, right renal artery stenosis, ureterolithotripsy, retinal detachment surgery and now with high risk prostate cancer, Joey 4+4=8 and pretreatment PSA of 8.43.  He presents for radiation treatment. He report urinary hesitancy and denies any other urinary or bowel issue. Completed 4680/8100 cGy today.

## 2021-02-17 PROCEDURE — 77387C: CUSTOM

## 2021-02-18 PROCEDURE — 77387C: CUSTOM

## 2021-02-22 ENCOUNTER — NON-APPOINTMENT (OUTPATIENT)
Age: 76
End: 2021-02-22

## 2021-02-22 PROCEDURE — 77387C: CUSTOM

## 2021-02-22 NOTE — HISTORY OF PRESENT ILLNESS
[FreeTextEntry1] : Mr. Pruitt is a  75 years old male with history of AAA s/p repair in 2017, renal insufficiency, right renal artery stenosis, ureterolithotripsy, retinal detachment surgery and now with high risk prostate cancer, Joey 4+4=8 and pretreatment PSA of 8.43.  He presents for radiation treatment. He denies urinary or bowel issue. Completed 5220/8100 cGy today. Taking Flomax once daily.

## 2021-02-23 PROCEDURE — 77427 RADIATION TX MANAGEMENT X5: CPT

## 2021-02-23 PROCEDURE — 77387C: CUSTOM

## 2021-02-24 PROCEDURE — 77387C: CUSTOM

## 2021-02-25 PROCEDURE — 77387C: CUSTOM

## 2021-02-26 PROCEDURE — 77387C: CUSTOM

## 2021-03-01 ENCOUNTER — NON-APPOINTMENT (OUTPATIENT)
Age: 76
End: 2021-03-01

## 2021-03-01 PROCEDURE — 77387C: CUSTOM

## 2021-03-01 NOTE — HISTORY OF PRESENT ILLNESS
[FreeTextEntry1] : Mr. Pruitt is a  75 years old male with history of AAA s/p repair in 2017, renal insufficiency, right renal artery stenosis, ureterolithotripsy, retinal detachment surgery and now with high risk prostate cancer, Joey 4+4=8 and pretreatment PSA of 8.43.  He presents for radiation treatment. He report urgency and denies anyother urinary or bowel issues. Completed 6120/8100 cGy today. Taking Flomax once daily.

## 2021-03-02 PROCEDURE — 77387C: CUSTOM

## 2021-03-02 PROCEDURE — 77427 RADIATION TX MANAGEMENT X5: CPT

## 2021-03-03 PROCEDURE — 77387C: CUSTOM

## 2021-03-04 ENCOUNTER — LABORATORY RESULT (OUTPATIENT)
Age: 76
End: 2021-03-04

## 2021-03-04 PROCEDURE — 77387C: CUSTOM

## 2021-03-05 PROCEDURE — 77387C: CUSTOM

## 2021-03-08 ENCOUNTER — NON-APPOINTMENT (OUTPATIENT)
Age: 76
End: 2021-03-08

## 2021-03-08 NOTE — HISTORY OF PRESENT ILLNESS
[FreeTextEntry1] : Mr. Pruitt is a  75 years old male with history of AAA s/p repair in 2017, renal insufficiency, right renal artery stenosis, ureterolithotripsy, retinal detachment surgery and now with high risk prostate cancer, Joey 4+4=8 and pretreatment PSA of 8.43.  He presents for radiation treatment. He report urgency and denies any other urinary or bowel issues. Completed 7020/8100 cGy today. Taking Flomax once daily.

## 2021-03-09 PROCEDURE — 77427 RADIATION TX MANAGEMENT X5: CPT

## 2021-03-09 PROCEDURE — 77387C: CUSTOM

## 2021-03-10 ENCOUNTER — RX RENEWAL (OUTPATIENT)
Age: 76
End: 2021-03-10

## 2021-03-10 LAB
ALBUMIN SERPL ELPH-MCNC: 4.6 G/DL
ALP BLD-CCNC: 72 U/L
ALT SERPL-CCNC: 24 U/L
ANION GAP SERPL CALC-SCNC: 17 MMOL/L
AST SERPL-CCNC: 25 U/L
BASOPHILS # BLD AUTO: 0.03 K/UL
BASOPHILS NFR BLD AUTO: 0.6 %
BILIRUB SERPL-MCNC: 0.3 MG/DL
BUN SERPL-MCNC: 50 MG/DL
CALCIUM SERPL-MCNC: 10.2 MG/DL
CHLORIDE SERPL-SCNC: 105 MMOL/L
CO2 SERPL-SCNC: 20 MMOL/L
CREAT SERPL-MCNC: 1.87 MG/DL
CREAT SPEC-SCNC: 33 MG/DL
EOSINOPHIL # BLD AUTO: 0.19 K/UL
EOSINOPHIL NFR BLD AUTO: 3.8 %
GLUCOSE SERPL-MCNC: 116 MG/DL
HCT VFR BLD CALC: 42.2 %
HGB BLD-MCNC: 13.2 G/DL
IMM GRANULOCYTES NFR BLD AUTO: 0.4 %
LYMPHOCYTES # BLD AUTO: 0.47 K/UL
LYMPHOCYTES NFR BLD AUTO: 9.4 %
MAGNESIUM SERPL-MCNC: 2.3 MG/DL
MAN DIFF?: NORMAL
MCHC RBC-ENTMCNC: 28.3 PG
MCHC RBC-ENTMCNC: 31.3 GM/DL
MCV RBC AUTO: 90.4 FL
MICROALBUMIN 24H UR DL<=1MG/L-MCNC: 2.1 MG/DL
MICROALBUMIN/CREAT 24H UR-RTO: 63 MG/G
MONOCYTES # BLD AUTO: 0.62 K/UL
MONOCYTES NFR BLD AUTO: 12.4 %
NEUTROPHILS # BLD AUTO: 3.65 K/UL
NEUTROPHILS NFR BLD AUTO: 73.4 %
PLATELET # BLD AUTO: 201 K/UL
POTASSIUM SERPL-SCNC: 4.9 MMOL/L
PROT SERPL-MCNC: 7.1 G/DL
RBC # BLD: 4.67 M/UL
RBC # FLD: 15.3 %
SODIUM SERPL-SCNC: 142 MMOL/L
WBC # FLD AUTO: 4.98 K/UL

## 2021-03-10 PROCEDURE — 77387C: CUSTOM

## 2021-03-11 PROCEDURE — 77387C: CUSTOM

## 2021-03-12 PROCEDURE — 77387C: CUSTOM

## 2021-03-15 ENCOUNTER — NON-APPOINTMENT (OUTPATIENT)
Age: 76
End: 2021-03-15

## 2021-03-15 PROCEDURE — 77387C: CUSTOM

## 2021-03-15 NOTE — HISTORY OF PRESENT ILLNESS
[FreeTextEntry1] : Mr. Pruitt is a  75 years old male with history of AAA s/p repair in 2017, renal insufficiency, right renal artery stenosis, ureterolithotripsy, retinal detachment surgery and now with high risk prostate cancer, Joey 4+4=8 and pretreatment PSA of 8.43.  He presents for radiation treatment. He report urgency and denies any other urinary or bowel issues. Completed /8100 cGy today. Taking Flomax once daily.

## 2021-03-16 PROCEDURE — 77387C: CUSTOM

## 2021-03-29 ENCOUNTER — APPOINTMENT (OUTPATIENT)
Dept: NEPHROLOGY | Facility: CLINIC | Age: 76
End: 2021-03-29
Payer: MEDICARE

## 2021-03-29 PROCEDURE — 99214 OFFICE O/P EST MOD 30 MIN: CPT | Mod: 95

## 2021-03-29 NOTE — HISTORY OF PRESENT ILLNESS
[Home] : at home, [unfilled] , at the time of the visit. [Other Location: e.g. Home (Enter Location, City,State)___] : at [unfilled] [Verbal consent obtained from patient] : the patient, [unfilled] [FreeTextEntry1] : Referred by Dr. Lezama for CKD.\par \par * Receiving radiation therapy for prostate cancer. * CKD stable, creatinine 1.87. Urine albumin 63. * Hyperkalemia improved on low K diet. * No recent kidney stones. * HTN controlled, BP 120s/70s. \par \par Previous history (25Jan21): * Dx with prostate cancer. Hormonal treatment started. No symptoms. * CKD stable, creatinine 1.9. * K increased to 5.6, he is now following strict low K diet. * No recent kidney stones. * On farxiga for proteinuria. * HTN controlled, 110 - 130s/70s. Average 120s/70s. \par \par Previous history (40Sxa08): * Dx with prostate cancer. Hormonal treatment started. * CKD stable, creatinine 1.85 in September 2020. US 2018 no stones.  * Hyperkalemia improved. * No recent kidney stones. * 115 mg albuminuria.  * HTN borderline controlled.  \par \par Previous history (45Qda42): * Scheduled for prostate biopsy under MAC with Dr. Hinojosa September 10th. . * CKD stable, [id number] creatinine 1.87. * Hyperkalemia improved. * DM controlled. * 300 mg proteinuria. * No recent kidney stones. * HTN controlled at home, SBP 110s-120/70. He stopped torsemide and sodium bicarbonate. * No chest pain or SOB with 10 minutes+ of aerobic exercise daily. \par \par Previous history (04Jun20): * HTN controlled, BP 120s/70s. No lightheadedness. * CKD stable, creatinine 1.87. * He is following up with urologist for elevated PSA. * K increased to 5.5. He has been eating bananas (despite being counseled not to) and will now stop. * DM controlled, HGA1c 6.6. * No kidney stone for 4 years. Attempting to drink 2-3 liters daily. \par \par Previous history (52Sqj50): * He is following up with urologist for elevated PSA. * CKD relatively stable, creatinine up to 1.85. * HTN controlled. Average /70s at home No lightheadedness. Compliant with medications. DM controlled, HGA1c 6.5. * Albuminuria 132 mg/g. \par \par Previous history (25Sep19): * Elevated PSA to 7.31. He is following up closely with urologist. * CKD stable, creatinine 1.85. * HTN controlled. No lightheadedness. Compliant with medications. * Slight rhinitis. \par \par Previous history (20Aug19): * CKD stable, creatinine 1.83. * BP 130s at home. He decreased diuretic to every other day. * Urine protein decreased to 300 mg/g. \par \par Previous history (07May19): * CKD stable, creatinine 1.89 last visit. * HTN previously uncontrolled but has decreased to 120/70s at home on lisinopril. *  Urine protein decreased to 400 mg/g. * \par \par Previous history (02Apr19): * CKD stable, creatinine 1.84. * DM uncontrolled. HGA1c 7.4. \par \par Previous history (29Jan19): * HTN borderline controlled. /80s  at home. No lightheadedness. Compliant with medications. Following low salt diet.  * In 2016 he had stenting of left renal artery. * He requires omeprazole for severe GERD and ranitidine has been ineffective. * Urine albumin 131 mg/g.* CKD stable, creatinine 1.84.  * He is following up closely with urologist for elevated PSA. * DM controlled, HGA1c 6.8. *  24 hour urine on 2/18 showed low urine citrate. His last kidney stone was in 2016. 11/17 US did not show nephrolithiasis. He was unable to tolerate potassium citrate due to GERD. \par \par Options for clinical preventative services\par \par Covid vaccine: Moderna March 2021 \par Influenza: september 2020\par Pneumonia:  pneumovax [id number] \par Shingles:\par TDAP:\par Colonoscopy: March 2020\par PSA: by urologist \par Dermatologist: Advised to see yearly\par HIV:\par Hep C:\par \par \par

## 2021-03-29 NOTE — ASSESSMENT
[FreeTextEntry1] : # CKD stage 3.\par * Therapies for kidney disease: blood pressure control; proteinuria reduction with ARB/ACEi; SGLT2 inhibitor; other evidence-based therapies including exercise, a plant-based low-oxalate diet, and 400 mcg folic acid daily\par * Cardiovascular disease prevention: counseling on healthy diet, physical activity, weight loss, alcohol limitation, blood pressure control\par * The patient has been counseled that chronic kidney disease is a significant condition and regular office followup with me (at least every 3 months for now) is important for monitoring and their health, and that it is their responsibility to make a follow up appointment.\par * The patient has been counseled never to stop taking their medications without discussing it with me or another doctor.\par * The patient has been counseled on avoiding NSAIDs.\par * The patient has been counseled on risk of acute renal failure and instructed to immediately call and speak with me or go immediately to ER with any severe symptoms, nausea, vomiting, diarrhea, chest pain, or shortness of breath.\par * A counseling information sheet has been given (today or previously). All their questions were answered.\par \par # HTN controlled.\par * Cont lisinopril, amlodipine, farxiga, metoprolol. \par * A counseling information sheet has been given (currently or previously, in-person or electronically). All their questions were answered.\par * The patient has been counseled to check their BP at home with an automatic arm cuff, write down the readings, and reach me directly on the phone immediately if they are persistently > 180 systolic or if SBP is less than 100 or if lightheadedness develops. They were counseled to bring in all blood pressure readings and medications next visit.\par * The patient has been counseled that regular office followup (at least every 3 months for now)  is important for monitoring and for their health, and that it is their responsibility to make follow up appointments.\par * The patient also has been counseled that they must never stop or change any medications without discussing this with me (or another physician). \par \par # Prostate cancer.\par * Follow up with oncology. \par \par # DM.\par * Cont farxiga.  \par

## 2021-04-21 LAB — PSA SERPL-MCNC: 0.09 NG/ML

## 2021-04-22 ENCOUNTER — APPOINTMENT (OUTPATIENT)
Dept: RADIATION ONCOLOGY | Facility: CLINIC | Age: 76
End: 2021-04-22
Payer: MEDICARE

## 2021-04-22 VITALS
BODY MASS INDEX: 28.22 KG/M2 | DIASTOLIC BLOOD PRESSURE: 83 MMHG | HEART RATE: 47 BPM | RESPIRATION RATE: 17 BRPM | WEIGHT: 174.82 LBS | SYSTOLIC BLOOD PRESSURE: 134 MMHG | OXYGEN SATURATION: 97 % | TEMPERATURE: 97.5 F

## 2021-04-22 PROCEDURE — 99024 POSTOP FOLLOW-UP VISIT: CPT

## 2021-04-22 NOTE — DISEASE MANAGEMENT
[1] : T1 [0] : M0 [0-10] : 0 -10 ng/mL [8] : [place] Score 8 [5] : 5 [IIIB] : IIIB [BiopsyDate] : 9/10/20 [MeasuredProstateVolume] : 79 [TotalCores] : 14 [TotalPositiveCores] : 5 [MaxCoreInvolvement] : 100

## 2021-04-22 NOTE — HISTORY OF PRESENT ILLNESS
[FreeTextEntry1] : Mr. Pruitt is a  75 years old male with history of AAA s/p repair in 2017, renal insufficiency, right renal artery stenosis, ureterolithotripsy, retinal detachment surgery and now with high risk prostate cancer, Joey 4+4=8 and pretreatment PSA of 8.43.  He underwent EBRT to a dose of 8100 Gy from 1/6/21 - 3/16/21. He tolerated radiation treatment without developing any grade 3 or higher acute toxicity as a result of his treatment.\par \par PSA\par 7/15/21  -  8.43\par 4/15/21  -  0.09  - post tx.\par \par Patient presents today for post treatment follow up. He report nocturia 2-3 times per night and denies any urinary issues. Has occasional constipation and he is not sexually active.\par

## 2021-04-22 NOTE — REVIEW OF SYSTEMS
[Negative] : Psychiatric [Nocturia] : nocturia [IPSS Score (0-40): ___] : IPSS score: [unfilled] [EPIC-CP Score (0-60): ___] : EPIC-CP score: [unfilled] [Hematuria: Grade 0] : Hematuria: Grade 0 [Urinary Incontinence: Grade 0] : Urinary Incontinence: Grade 0  [Urinary Retention: Grade 0] : Urinary Retention: Grade 0 [Urinary Tract Pain: Grade 0] : Urinary Tract Pain: Grade 0 [Urinary Urgency: Grade 0] : Urinary Urgency: Grade 0 [Urinary Frequency: Grade 0] : Urinary Frequency: Grade 0 [Ejaculation Disorder: Grade 1 - Diminished ejaculation] : Ejaculation Disorder: Grade 1 - Diminished ejaculation  [Erectile Dysfunction: Grade 1 - Decrease in erectile function (frequency or rigidity of erections) but intervention not indicated (e.g., medication or use of mechanical device, penile pump)] : Erectile Dysfunction: Grade 1 - Decrease in erectile function (frequency or rigidity of erections) but intervention not indicated (e.g., medication or use of mechanical device, penile pump) [Urinary Frequency] : no urinary frequency

## 2021-04-22 NOTE — END OF VISIT
Problem: Mobility Impaired (Adult and Pediatric)  Goal: *Acute Goals and Plan of Care (Insert Text)  Physical Therapy Goals  Initiated 8/31/2017  1. Patient will move from supine to sit and sit to supine in bed with supervision/set-up within 5 day(s). 2. Patient will transfer from bed to chair and chair to bed with supervision/set-up using the least restrictive device within 5 day(s). 3. Patient will perform sit to stand with supervision/set-up within 5 day(s). 4. Patient will ambulate with supervision/set-up for 100 feet with the least restrictive device within 5 day(s). PHYSICAL THERAPY TREATMENT  Patient: Jus Levy Sr. (81 y.o. male)  Date: 9/1/2017  Diagnosis: Altered mental status Altered mental status       Precautions: Bed Alarm, Fall      ASSESSMENT:  Patient received in bed and pleasantly confused. During introduction of self, patient stated needed to get to the bathroom. Moving well with bed mobility and managed to get  Up to standing without assist.  Ambulated to the bathroom with single point cane and stand by assist.  Managed briefs himself and pedro-anal care. Returned to sit in chair for  Breakfast.  Overall, moving with supervision/stand by assist.  Confused. Will benefit from some skilled therapy to maxmize his mobility/stability. Progression toward goals:  [ ]    Improving appropriately and progressing toward goals  [ ]    Improving slowly and progressing toward goals  [ ]    Not making progress toward goals and plan of care will be adjusted       PLAN:  Patient continues to benefit from skilled intervention to address the above impairments. Continue treatment per established plan of care. Discharge Recommendations:  Skilled Nursing Facility  Further Equipment Recommendations for Discharge:  none       SUBJECTIVE:   Patient stated I didn't know I was in [facility].       OBJECTIVE DATA SUMMARY:   Critical Behavior:  Neurologic State: Alert, Confused  Orientation Level: Oriented to person, Disoriented to situation, Disoriented to place, Disoriented to time  Cognition: Decreased attention/concentration, Decreased command following  Safety/Judgement: Decreased awareness of environment, Decreased awareness of need for assistance, Decreased awareness of need for safety, Decreased insight into deficits  Functional Mobility Training:  Bed Mobility:   supervision                 Transfers:  Sit to Stand: Supervision  Stand to Sit: Supervision                             Balance:  Sitting: Intact  Standing: Impaired; With support  Standing - Static: Fair  Standing - Dynamic : Fair  Ambulation/Gait Training:  Distance (ft): 30 Feet (ft)  Assistive Device: Gait belt;Cane, straight  Ambulation - Level of Assistance: Stand-by asssistance        Gait Abnormalities: Decreased step clearance        Base of Support: Narrowed; Center of gravity altered     Speed/Rocio: Pace decreased (<100 feet/min)            Pain:  Pain Scale 1: Numeric (0 - 10)  Pain Intensity 1: 0      After treatment:   [X]    Patient left in no apparent distress sitting up in chair  [ ]    Patient left in no apparent distress in bed  [X]    Call bell left within reach  [X]    Nursing notified  [ ]    Caregiver present  [X]    Bed alarm activated      COMMUNICATION/COLLABORATION:   The patients plan of care was discussed with: Registered Nurse and Shira 77, PT   Time Calculation: 20 mins [Time Spent: ___ minutes] : I have spent [unfilled] minutes of time on the encounter. [>50% of the face to face encounter time was spent on counseling and/or coordination of care for ___] : Greater than 50% of the face to face encounter time was spent on counseling and/or coordination of care for [unfilled]

## 2021-06-29 ENCOUNTER — RX RENEWAL (OUTPATIENT)
Age: 76
End: 2021-06-29

## 2021-07-02 ENCOUNTER — APPOINTMENT (OUTPATIENT)
Dept: NEPHROLOGY | Facility: CLINIC | Age: 76
End: 2021-07-02
Payer: MEDICARE

## 2021-07-02 VITALS — HEART RATE: 66 BPM | SYSTOLIC BLOOD PRESSURE: 126 MMHG | DIASTOLIC BLOOD PRESSURE: 79 MMHG

## 2021-07-02 PROCEDURE — 99214 OFFICE O/P EST MOD 30 MIN: CPT

## 2021-07-02 NOTE — ASSESSMENT
[FreeTextEntry1] : # HTN controlled.\par * Continue lisnopril, amlodipien, farxiga, metoprolol.\par * The patient's blood pressure was checked with the [employer] [id number]-[id number] using the SPRINT trial protocol after sitting quietly in an empty room with arm supported, back supported, and feet on the floor for 5 minutes. The average of 3 readings were taken.\par * A counseling information sheet has been given (currently or previously, in-person or electronically). All their questions were answered.\par * The patient has been counseled to check their BP at home with an automatic arm cuff, write down the readings, and reach me directly on the phone immediately if they are persistently > 180 systolic or if SBP is less than 100 or if lightheadedness develops. They were counseled to bring in all blood pressure readings and medications next visit.\par * The patient has been counseled that regular office followup (at least every 4 months for now)  is important for monitoring and for their health, and that it is their responsibility to make follow up appointments.\par * The patient also has been counseled that they must never stop or change any medications without discussing this with me (or another physician). \par \par # CKD stable.\par * Therapies for kidney disease: blood pressure control; DM control; proteinuria reduction with ARB/ACEi; SGLT2 inhibitor; other evidence-based therapies including exercise, a plant-based lower oxalate diet, and 400 mcg folic acid daily\par * Cardiovascular disease prevention: counseling on healthy diet, physical activity, weight loss, alcohol limitation, blood pressure control; moderate intensity statin therapy\par * The patient has been counseled that chronic kidney disease is a significant condition and regular office followup with me (at least every 4 months for now) is important for monitoring and their health, and that it is their responsibility to make a follow up appointment.\par * The patient has been counseled never to stop taking their medications without discussing it with me or another doctor.\par * The patient has been counseled on avoiding NSAIDs.\par * The patient has been counseled on risk of acute renal failure and instructed to immediately call and speak with me or go immediately to ER with any severe symptoms, nausea, vomiting, diarrhea, chest pain, or shortness of breath.\par * A counseling information sheet has been given (today or previously). All their questions were answered.\par \par # Prostate cancer.\par * Follow up with oncology. \par \par # DM.\par * Cont farxiga.

## 2021-07-02 NOTE — HISTORY OF PRESENT ILLNESS
[FreeTextEntry1] : Referred by Dr. Lezama for CKD.\par \par * HTN controlled.  - 120s at home. No lightheadedness. Compliant with medications. * CKD stable, creatinine 1.63. * No recent kidney stones. * DM controlled,HGA1c 7. * 63 - 111 mg albuminuria. \par \par Previous history (29Mar): * Receiving radiation therapy for prostate cancer. * CKD stable, creatinine 1.87. Urine albumin 63. * Hyperkalemia improved on low K diet. * No recent kidney stones. * HTN controlled, BP 120s/70s. \par \par Previous history (25Jan21): * Dx with prostate cancer. Hormonal treatment started. No symptoms. * CKD stable, creatinine 1.9. * K increased to 5.6, he is now following strict low K diet. * No recent kidney stones. * On farxiga for proteinuria. * HTN controlled, 110 - 130s/70s. Average 120s/70s. \par \par Previous history (26Apz38): * Dx with prostate cancer. Hormonal treatment started. * CKD stable, creatinine 1.85 in September 2020. US 2018 no stones.  * Hyperkalemia improved. * No recent kidney stones. * 115 mg albuminuria.  * HTN borderline controlled.  \par \par Previous history (67Qau31): * Scheduled for prostate biopsy under MAC with Dr. Hinojosa September 10th. . * CKD stable, [id number] creatinine 1.87. * Hyperkalemia improved. * DM controlled. * 300 mg proteinuria. * No recent kidney stones. * HTN controlled at home, SBP 110s-120/70. He stopped torsemide and sodium bicarbonate. * No chest pain or SOB with 10 minutes+ of aerobic exercise daily. \par \par Previous history (04Jun20): * HTN controlled, BP 120s/70s. No lightheadedness. * CKD stable, creatinine 1.87. * He is following up with urologist for elevated PSA. * K increased to 5.5. He has been eating bananas (despite being counseled not to) and will now stop. * DM controlled, HGA1c 6.6. * No kidney stone for 4 years. Attempting to drink 2-3 liters daily. \par \par Previous history (67Ule62): * He is following up with urologist for elevated PSA. * CKD relatively stable, creatinine up to 1.85. * HTN controlled. Average /70s at home No lightheadedness. Compliant with medications. DM controlled, HGA1c 6.5. * Albuminuria 132 mg/g. \par \par Previous history (25Sep19): * Elevated PSA to 7.31. He is following up closely with urologist. * CKD stable, creatinine 1.85. * HTN controlled. No lightheadedness. Compliant with medications. * Slight rhinitis. \par \par Previous history (20Aug19): * CKD stable, creatinine 1.83. * BP 130s at home. He decreased diuretic to every other day. * Urine protein decreased to 300 mg/g. \par \par Previous history (07May19): * CKD stable, creatinine 1.89 last visit. * HTN previously uncontrolled but has decreased to 120/70s at home on lisinopril. *  Urine protein decreased to 400 mg/g. * \par \par Previous history (02Apr19): * CKD stable, creatinine 1.84. * DM uncontrolled. HGA1c 7.4. \par \par Previous history (29Jan19): * HTN borderline controlled. /80s  at home. No lightheadedness. Compliant with medications. Following low salt diet.  * In 2016 he had stenting of left renal artery. * He requires omeprazole for severe GERD and ranitidine has been ineffective. * Urine albumin 131 mg/g.* CKD stable, creatinine 1.84.  * He is following up closely with urologist for elevated PSA. * DM controlled, HGA1c 6.8. *  24 hour urine on 2/18 showed low urine citrate. His last kidney stone was in 2016. 11/17 US did not show nephrolithiasis. He was unable to tolerate potassium citrate due to GERD. \par \par Options for clinical preventative services\par \par Covid vaccine: Moderna March 2021 \par Influenza: september 2020\par Pneumonia:  pneumovax [id number] \par Shingles: 2019 x 2\par TDAP:\par Colonoscopy: March 2020\par PSA: by urologist \par Dermatologist: Advised to see yearly\par \par \par \par

## 2021-07-02 NOTE — PHYSICAL EXAM
[General Appearance - In No Acute Distress] : in no acute distress [General Appearance - Alert] : alert [Neck Appearance] : the appearance of the neck was normal [Neck Cervical Mass (___cm)] : no neck mass was observed [Jugular Venous Distention Increased] : there was no jugular-venous distention [Thyroid Diffuse Enlargement] : the thyroid was not enlarged [Thyroid Nodule] : there were no palpable thyroid nodules [Auscultation Breath Sounds / Voice Sounds] : lungs were clear to auscultation bilaterally [Heart Rate And Rhythm] : heart rate was normal and rhythm regular [Heart Sounds] : normal S1 and S2 [Heart Sounds Gallop] : no gallops [Murmurs] : no murmurs [Heart Sounds Pericardial Friction Rub] : no pericardial rub [Veins - Varicosity Changes] : there were no varicosital changes [Bowel Sounds] : normal bowel sounds [Abdomen Soft] : soft [Abdomen Tenderness] : non-tender [] : no hepato-splenomegaly [Abdomen Mass (___ Cm)] : no abdominal mass palpated [Cervical Lymph Nodes Enlarged Posterior Bilaterally] : posterior cervical [Cervical Lymph Nodes Enlarged Anterior Bilaterally] : anterior cervical [Supraclavicular Lymph Nodes Enlarged Bilaterally] : supraclavicular [FreeTextEntry1] : No foot lesions

## 2021-08-30 ENCOUNTER — RX RENEWAL (OUTPATIENT)
Age: 76
End: 2021-08-30

## 2021-09-21 ENCOUNTER — RX RENEWAL (OUTPATIENT)
Age: 76
End: 2021-09-21

## 2021-09-23 ENCOUNTER — APPOINTMENT (OUTPATIENT)
Dept: RADIATION ONCOLOGY | Facility: CLINIC | Age: 76
End: 2021-09-23
Payer: MEDICARE

## 2021-09-23 VITALS
DIASTOLIC BLOOD PRESSURE: 83 MMHG | OXYGEN SATURATION: 99 % | SYSTOLIC BLOOD PRESSURE: 133 MMHG | TEMPERATURE: 97 F | HEART RATE: 50 BPM | RESPIRATION RATE: 17 BRPM | BODY MASS INDEX: 28.15 KG/M2 | WEIGHT: 174.38 LBS

## 2021-09-23 PROCEDURE — 99213 OFFICE O/P EST LOW 20 MIN: CPT

## 2021-09-23 NOTE — DISEASE MANAGEMENT
[1] : T1 [0] : M0 [0-10] : 0 -10 ng/mL [8] : [place] Score 8 [5] : 5 [IIIB] : IIIB [BiopsyDate] : 9/10/20 [MeasuredProstateVolume] : 79 [TotalCores] : 14 [TotalPositiveCores] : 5 [MaxCoreInvolvement] : 100

## 2021-09-23 NOTE — REVIEW OF SYSTEMS
[Negative] : Psychiatric [Hematuria: Grade 0] : Hematuria: Grade 0 [Urinary Incontinence: Grade 0] : Urinary Incontinence: Grade 0  [Urinary Retention: Grade 0] : Urinary Retention: Grade 0 [Urinary Tract Pain: Grade 0] : Urinary Tract Pain: Grade 0 [Urinary Urgency: Grade 0] : Urinary Urgency: Grade 0 [Urinary Frequency: Grade 0] : Urinary Frequency: Grade 0 [Ejaculation Disorder: Grade 1 - Diminished ejaculation] : Ejaculation Disorder: Grade 1 - Diminished ejaculation  [Erectile Dysfunction: Grade 1 - Decrease in erectile function (frequency or rigidity of erections) but intervention not indicated (e.g., medication or use of mechanical device, penile pump)] : Erectile Dysfunction: Grade 1 - Decrease in erectile function (frequency or rigidity of erections) but intervention not indicated (e.g., medication or use of mechanical device, penile pump) [Nocturia] : nocturia [IPSS Score (0-40): ___] : IPSS score: [unfilled] [EPIC-CP Score (0-60): ___] : EPIC-CP score: [unfilled] [Urinary Frequency] : no urinary frequency

## 2021-09-23 NOTE — HISTORY OF PRESENT ILLNESS
[FreeTextEntry1] : Mr. Pruitt is a  75 years old male with history of AAA s/p repair in 2017, renal insufficiency, right renal artery stenosis, ureterolithotripsy, retinal detachment surgery and now with high risk prostate cancer, [place] 4+4=8 and pretreatment PSA of 8.43.  He underwent EBRT to a dose of 8100 Gy from 1/6/21 - 3/16/21. He tolerated radiation treatment without developing any grade 3 or higher acute toxicity as a result of his treatment.\par \par PSA\par 7/15/20  -  8.43 - pre-tx\par 4/15/21  -  0.09  - post tx.\par 9/15/21 -   0.16\par \par Patient presents today for follow up. He report nocturia 2-3 times per night and denies any other urianry or bowel issues. He is not sexually active.

## 2021-11-05 ENCOUNTER — APPOINTMENT (OUTPATIENT)
Dept: NEPHROLOGY | Facility: CLINIC | Age: 76
End: 2021-11-05
Payer: MEDICARE

## 2021-11-05 VITALS — DIASTOLIC BLOOD PRESSURE: 64 MMHG | SYSTOLIC BLOOD PRESSURE: 121 MMHG | HEART RATE: 51 BPM

## 2021-11-05 PROCEDURE — 99214 OFFICE O/P EST MOD 30 MIN: CPT

## 2021-11-05 NOTE — ASSESSMENT
[FreeTextEntry1] : # HTN controlled.\par * Continue lisnopril, amlodipien, farxiga, metoprolol.\par * The patient's blood pressure was checked with the [employer] [id number]-[id number] using the SPRINT trial protocol after sitting quietly in an empty room with arm supported, back supported, and feet on the floor for 5 minutes. The average of 3 readings were taken.\par * A counseling information sheet has been given (currently or previously, in-person or electronically). All their questions were answered.\par * The patient has been counseled to check their BP at home with an automatic arm cuff, write down the readings, and reach me directly on the phone immediately if they are persistently > 180 systolic or if SBP is less than 100 or if lightheadedness develops. They were counseled to bring in all blood pressure readings and medications next visit.\par * The patient has been counseled that regular office followup (at least every 3 months for now) is important for monitoring and for their health, and that it is their responsibility to make follow up appointments.\par * The patient also has been counseled that they must never stop or change any medications without discussing this with me (or another physician). \par \par # CKD stable.\par * Therapies for kidney disease: blood pressure control; DM control; proteinuria reduction with ARB/ACEi; SGLT2 inhibitor; other evidence-based therapies including exercise, a plant-based lower oxalate diet, and 400 mcg folic acid daily\par * Cardiovascular disease prevention: counseling on healthy diet, physical activity, weight loss, alcohol limitation, blood pressure control; moderate intensity statin therapy\par * The patient has been counseled that chronic kidney disease is a significant condition and regular office followup with me (at least every 3 months for now) is important for monitoring and their health, and that it is their responsibility to make a follow up appointment.\par * The patient has been counseled never to stop taking their medications without discussing it with me or another doctor.\par * The patient has been counseled on avoiding NSAIDs.\par * The patient has been counseled on risk of acute renal failure and instructed to immediately call and speak with me or go immediately to ER with any severe symptoms, nausea, vomiting, diarrhea, chest pain, or shortness of breath.\par * A counseling information sheet has been given (today or previously). All their questions were answered.\par \par # Prostate cancer.\par * Follow up with oncology. \par \par # DM.\par * Cont farxiga. \par

## 2021-11-05 NOTE — PHYSICAL EXAM
[General Appearance - Alert] : alert [General Appearance - In No Acute Distress] : in no acute distress [Outer Ear] : the ears and nose were normal in appearance [Oropharynx] : the oropharynx was normal [Neck Appearance] : the appearance of the neck was normal [Neck Cervical Mass (___cm)] : no neck mass was observed [Jugular Venous Distention Increased] : there was no jugular-venous distention [Thyroid Diffuse Enlargement] : the thyroid was not enlarged [Thyroid Nodule] : there were no palpable thyroid nodules [Auscultation Breath Sounds / Voice Sounds] : lungs were clear to auscultation bilaterally [Heart Rate And Rhythm] : heart rate was normal and rhythm regular [Heart Sounds] : normal S1 and S2 [Heart Sounds Gallop] : no gallops [Murmurs] : no murmurs [Heart Sounds Pericardial Friction Rub] : no pericardial rub [Veins - Varicosity Changes] : there were no varicosital changes [Bowel Sounds] : normal bowel sounds [Abdomen Tenderness] : non-tender [Abdomen Soft] : soft [] : no hepato-splenomegaly [Abdomen Mass (___ Cm)] : no abdominal mass palpated [FreeTextEntry1] : trace ankle edema

## 2021-11-05 NOTE — HISTORY OF PRESENT ILLNESS
[FreeTextEntry1] : Referred by Dr. Lezama for CKD.\par \par * CKD stable, creatinine 1.75. * HTN controlled.  - 120s at home. No lightheadedness. Compliant with medications. * No recent kidney stones. * DM borderline controlled, HGA1c 7.3. \par \par Previous history (02Ndb43): * HTN controlled.  - 120s at home. No lightheadedness. Compliant with medications. * CKD stable, creatinine 1.63. * No recent kidney stones. * DM controlled,HGA1c 7. * 63 - 111 mg albuminuria. \par \par Previous history (29Mar): * Receiving radiation therapy for prostate cancer. * CKD stable, creatinine 1.87. Urine albumin 63. * Hyperkalemia improved on low K diet. * No recent kidney stones. * HTN controlled, BP 120s/70s. \par \par Previous history (25Jan21): * Dx with prostate cancer. Hormonal treatment started. No symptoms. * CKD stable, creatinine 1.9. * K increased to 5.6, he is now following strict low K diet. * No recent kidney stones. * On farxiga for proteinuria. * HTN controlled, 110 - 130s/70s. Average 120s/70s. \par \par Previous history (58Iru07): * Dx with prostate cancer. Hormonal treatment started. * CKD stable, creatinine 1.85 in September 2020. US 2018 no stones.  * Hyperkalemia improved. * No recent kidney stones. * 115 mg albuminuria.  * HTN borderline controlled.  \par \par Previous history (54Ahl41): * Scheduled for prostate biopsy under MAC with Dr. Hinojosa September 10th. . * CKD stable, [id number] creatinine 1.87. * Hyperkalemia improved. * DM controlled. * 300 mg proteinuria. * No recent kidney stones. * HTN controlled at home, SBP 110s-120/70. He stopped torsemide and sodium bicarbonate. * No chest pain or SOB with 10 minutes+ of aerobic exercise daily. \par \par Previous history (04Jun20): * HTN controlled, BP 120s/70s. No lightheadedness. * CKD stable, creatinine 1.87. * He is following up with urologist for elevated PSA. * K increased to 5.5. He has been eating bananas (despite being counseled not to) and will now stop. * DM controlled, HGA1c 6.6. * No kidney stone for 4 years. Attempting to drink 2-3 liters daily. \par \par Previous history (18Dec19): * He is following up with urologist for elevated PSA. * CKD relatively stable, creatinine up to 1.85. * HTN controlled. Average /70s at home No lightheadedness. Compliant with medications. DM controlled, HGA1c 6.5. * Albuminuria 132 mg/g. \par \par Previous history (25Sep19): * Elevated PSA to 7.31. He is following up closely with urologist. * CKD stable, creatinine 1.85. * HTN controlled. No lightheadedness. Compliant with medications. * Slight rhinitis. \par \par Previous history (20Aug19): * CKD stable, creatinine 1.83. * BP 130s at home. He decreased diuretic to every other day. * Urine protein decreased to 300 mg/g. \par \par Previous history (07May19): * CKD stable, creatinine 1.89 last visit. * HTN previously uncontrolled but has decreased to 120/70s at home on lisinopril. *  Urine protein decreased to 400 mg/g. * \par \par Previous history (02Apr19): * CKD stable, creatinine 1.84. * DM uncontrolled. HGA1c 7.4. \par \par Previous history (29Jan19): * HTN borderline controlled. /80s  at home. No lightheadedness. Compliant with medications. Following low salt diet.  * In 2016 he had stenting of left renal artery. * He requires omeprazole for severe GERD and ranitidine has been ineffective. * Urine albumin 131 mg/g.* CKD stable, creatinine 1.84.  * He is following up closely with urologist for elevated PSA. * DM controlled, HGA1c 6.8. *  24 hour urine on 2/18 showed low urine citrate. His last kidney stone was in 2016. 11/17 US did not show nephrolithiasis. He was unable to tolerate potassium citrate due to GERD. \par \par Options for clinical preventative services\par \par Covid vaccine: Moderna March 2021; Booster Covid 2021. \par Influenza: september 2020; September 2021 \par Pneumonia:  pneumovax [id number] \par Shingles: 2019 x 2\par TDAP:\par Colonoscopy: March 2020\par PSA: by urologist \par Dermatologist: Advised to see yearly\par \par \par \par

## 2021-12-17 ENCOUNTER — RX RENEWAL (OUTPATIENT)
Age: 76
End: 2021-12-17

## 2022-02-25 ENCOUNTER — APPOINTMENT (OUTPATIENT)
Dept: NEPHROLOGY | Facility: CLINIC | Age: 77
End: 2022-02-25
Payer: MEDICARE

## 2022-02-25 DIAGNOSIS — R97.20 ELEVATED PROSTATE, SPECIFIC ANTIGEN [PSA]: ICD-10-CM

## 2022-02-25 PROCEDURE — 99214 OFFICE O/P EST MOD 30 MIN: CPT | Mod: 95

## 2022-02-25 NOTE — PHYSICAL EXAM
[General Appearance - Alert] : alert [General Appearance - In No Acute Distress] : in no acute distress [Neck Appearance] : the appearance of the neck was normal [Neck Cervical Mass (___cm)] : no neck mass was observed [Jugular Venous Distention Increased] : there was no jugular-venous distention [Thyroid Diffuse Enlargement] : the thyroid was not enlarged [Thyroid Nodule] : there were no palpable thyroid nodules [Auscultation Breath Sounds / Voice Sounds] : lungs were clear to auscultation bilaterally [Heart Rate And Rhythm] : heart rate was normal and rhythm regular [Heart Sounds] : normal S1 and S2 [Heart Sounds Gallop] : no gallops [Murmurs] : no murmurs [Heart Sounds Pericardial Friction Rub] : no pericardial rub [Veins - Varicosity Changes] : there were no varicosital changes [Bowel Sounds] : normal bowel sounds [Abdomen Soft] : soft [Abdomen Tenderness] : non-tender [] : no hepato-splenomegaly [Abdomen Mass (___ Cm)] : no abdominal mass palpated [Cervical Lymph Nodes Enlarged Posterior Bilaterally] : posterior cervical [Cervical Lymph Nodes Enlarged Anterior Bilaterally] : anterior cervical [Supraclavicular Lymph Nodes Enlarged Bilaterally] : supraclavicular [FreeTextEntry1] : trace ankle edema

## 2022-02-25 NOTE — ASSESSMENT
[FreeTextEntry1] : \par # HTN controlled.\par * Continue lisnopril, amlodipine, metoprolol.\par * The patient's blood pressure was checked with the [employer] [id number]-[id number] using the SPRINT trial protocol after sitting quietly in an empty room with arm supported, back supported, and feet on the floor for 5 minutes. The average of 3 readings were taken.\par * A counseling information sheet has been given (currently or previously, in-person or electronically). All their questions were answered.\par * The patient has been counseled to check their BP at home with an automatic arm cuff, write down the readings, and reach me directly on the phone immediately if they are persistently > 180 systolic or if SBP is less than 100 or if lightheadedness develops. They were counseled to bring in all blood pressure readings and medications next visit.\par * The patient has been counseled that regular office followup (at least every 3 months for now) is important for monitoring and for their health, and that it is their responsibility to make follow up appointments.\par * The patient also has been counseled that they must never stop or change any medications without discussing this with me (or another physician). \par \par # CKD stable.\par * Therapies for kidney disease: blood pressure control; DM control; proteinuria reduction with ARB/ACEi; SGLT2 inhibitor; other evidence-based therapies including exercise, a plant-based lower oxalate diet, and 400 mcg folic acid daily\par * Cardiovascular disease prevention: counseling on healthy diet, physical activity, weight loss, alcohol limitation, blood pressure control; moderate intensity statin therapy\par * The patient has been counseled that chronic kidney disease is a significant condition and regular office followup with me (at least every 3 months for now) is important for monitoring and their health, and that it is their responsibility to make a follow up appointment.\par * The patient has been counseled never to stop taking their medications without discussing it with me or another doctor.\par * The patient has been counseled on avoiding NSAIDs.\par * The patient has been counseled on risk of acute renal failure and instructed to immediately call and speak with me or go immediately to ER with any severe symptoms, nausea, vomiting, diarrhea, chest pain, or shortness of breath.\par * A counseling information sheet has been given (today or previously). All their questions were answered.\par \par # Prostate cancer.\par * Follow up with oncology. \par \par # DM.\par * Follow up HGA1c.\par \par # Possible urinary tract inflammation, though urine culture negative. \par * Stop farxiga.\par * Appointment next week with urologist.\par * Call immeidately with any worsening, fevers, or chills.

## 2022-02-25 NOTE — HISTORY OF PRESENT ILLNESS
Low risk for surgery, will send form.  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   [Home] : at home, [unfilled] , at the time of the visit. [Medical Office: ([facility])___] : at the medical office located in  [Verbal consent obtained from patient] : the patient, [unfilled] [FreeTextEntry1] : Referred by Dr. Lezama for CKD.\par \par * Labs from 2/10 reviewed. CKD stable, creatinine 1.89. * * HTN controlled. No lightheadedness. Compliant with medications. * DM controlled, HGA1c 6.7. * Only at night after urinating he occasionally has decreased stream with a small amount of dysuria at the end of urination. No back pain or fever or chills. This has occurred for 3 weeks. \par \par Previous history (05Nov21): * CKD stable, creatinine 1.75. * HTN controlled.  - 120s at home. No lightheadedness. Compliant with medications. * No recent kidney stones. * DM borderline controlled, HGA1c 7.3. \par \par Previous history (02Jul21): * HTN controlled.  - 120s at home. No lightheadedness. Compliant with medications. * CKD stable, creatinine 1.63. * No recent kidney stones. * DM controlled,HGA1c 7. * 63 - 111 mg albuminuria. \par \par Previous history (29Mar): * Receiving radiation therapy for prostate cancer. * CKD stable, creatinine 1.87. Urine albumin 63. * Hyperkalemia improved on low K diet. * No recent kidney stones. * HTN controlled, BP 120s/70s. \par \par Previous history (25Jan21): * Dx with prostate cancer. Hormonal treatment started. No symptoms. * CKD stable, creatinine 1.9. * K increased to 5.6, he is now following strict low K diet. * No recent kidney stones. * On farxiga for proteinuria. * HTN controlled, 110 - 130s/70s. Average 120s/70s. \par \par Previous history (41Rdp98): * Dx with prostate cancer. Hormonal treatment started. * CKD stable, creatinine 1.85 in September 2020. US 2018 no stones.  * Hyperkalemia improved. * No recent kidney stones. * 115 mg albuminuria.  * HTN borderline controlled.  \par \par Previous history (76Bgb35): * Scheduled for prostate biopsy under MAC with Dr. Hinojosa September 10th. . * CKD stable, [id number] creatinine 1.87. * Hyperkalemia improved. * DM controlled. * 300 mg proteinuria. * No recent kidney stones. * HTN controlled at home, SBP 110s-120/70. He stopped torsemide and sodium bicarbonate. * No chest pain or SOB with 10 minutes+ of aerobic exercise daily. \par \par Previous history (04Jun20): * HTN controlled, BP 120s/70s. No lightheadedness. * CKD stable, creatinine 1.87. * He is following up with urologist for elevated PSA. * K increased to 5.5. He has been eating bananas (despite being counseled not to) and will now stop. * DM controlled, HGA1c 6.6. * No kidney stone for 4 years. Attempting to drink 2-3 liters daily. \par \par Previous history (18Dec19): * He is following up with urologist for elevated PSA. * CKD relatively stable, creatinine up to 1.85. * HTN controlled. Average /70s at home No lightheadedness. Compliant with medications. DM controlled, HGA1c 6.5. * Albuminuria 132 mg/g. \par \par Previous history (25Sep19): * Elevated PSA to 7.31. He is following up closely with urologist. * CKD stable, creatinine 1.85. * HTN controlled. No lightheadedness. Compliant with medications. * Slight rhinitis. \par \par Previous history (20Aug19): * CKD stable, creatinine 1.83. * BP 130s at home. He decreased diuretic to every other day. * Urine protein decreased to 300 mg/g. \par \par Previous history (07May19): * CKD stable, creatinine 1.89 last visit. * HTN previously uncontrolled but has decreased to 120/70s at home on lisinopril. *  Urine protein decreased to 400 mg/g. * \par \par Previous history (02Apr19): * CKD stable, creatinine 1.84. * DM uncontrolled. HGA1c 7.4. \par \par Previous history (29Jan19): * HTN borderline controlled. /80s  at home. No lightheadedness. Compliant with medications. Following low salt diet.  * In 2016 he had stenting of left renal artery. * He requires omeprazole for severe GERD and ranitidine has been ineffective. * Urine albumin 131 mg/g.* CKD stable, creatinine 1.84.  * He is following up closely with urologist for elevated PSA. * DM controlled, HGA1c 6.8. *  24 hour urine on 2/18 showed low urine citrate. His last kidney stone was in 2016. 11/17 US did not show nephrolithiasis. He was unable to tolerate potassium citrate due to GERD. \par \par Options for clinical preventative services\par \par Covid vaccine: Moderna March 2021; Booster Covid 2021. \par Influenza: september 2020; September 2021 \par Pneumonia:  pneumovax [id number] \par Shingles: 2019 x 2\par TDAP:\par Colonoscopy: March 2020\par PSA: by urologist \par Dermatologist: Advised to see yearly\par \par \par \par

## 2022-03-10 ENCOUNTER — APPOINTMENT (OUTPATIENT)
Dept: RADIATION ONCOLOGY | Facility: CLINIC | Age: 77
End: 2022-03-10
Payer: MEDICARE

## 2022-03-10 VITALS
HEART RATE: 48 BPM | HEIGHT: 66 IN | BODY MASS INDEX: 27.32 KG/M2 | OXYGEN SATURATION: 97 % | SYSTOLIC BLOOD PRESSURE: 153 MMHG | RESPIRATION RATE: 16 BRPM | DIASTOLIC BLOOD PRESSURE: 88 MMHG | TEMPERATURE: 98 F | WEIGHT: 170 LBS

## 2022-03-10 PROCEDURE — 99213 OFFICE O/P EST LOW 20 MIN: CPT

## 2022-03-10 NOTE — REVIEW OF SYSTEMS
[Negative] : Psychiatric [Hematuria: Grade 0] : Hematuria: Grade 0 [Urinary Incontinence: Grade 0] : Urinary Incontinence: Grade 0  [Urinary Retention: Grade 0] : Urinary Retention: Grade 0 [Urinary Tract Pain: Grade 0] : Urinary Tract Pain: Grade 0 [Urinary Urgency: Grade 0] : Urinary Urgency: Grade 0 [Urinary Frequency: Grade 0] : Urinary Frequency: Grade 0 [Ejaculation Disorder: Grade 1 - Diminished ejaculation] : Ejaculation Disorder: Grade 1 - Diminished ejaculation  [Erectile Dysfunction: Grade 1 - Decrease in erectile function (frequency or rigidity of erections) but intervention not indicated (e.g., medication or use of mechanical device, penile pump)] : Erectile Dysfunction: Grade 1 - Decrease in erectile function (frequency or rigidity of erections) but intervention not indicated (e.g., medication or use of mechanical device, penile pump) [IPSS Score (0-40): ___] : IPSS score: [unfilled] [EPIC-CP Score (0-60): ___] : EPIC-CP score: [unfilled]

## 2022-03-10 NOTE — DISEASE MANAGEMENT
[1] : T1 [0] : M0 [0-10] : 0 -10 ng/mL [5] : 5 [IIIB] : IIIB [MeasuredProstateVolume] : 79 [TotalCores] : 14 [TotalPositiveCores] : 5 [MaxCoreInvolvement] : 100

## 2022-03-10 NOTE — HISTORY OF PRESENT ILLNESS
[FreeTextEntry1] : Mr. Pruitt is a  75 years old male with history of AAA s/p repair in 2017, renal insufficiency, right renal artery stenosis, ureterolithotripsy, retinal detachment surgery and now with high risk prostate cancer, Joey 4+4=8 and pretreatment PSA of 8.43.  He underwent EBRT to a dose of 8100 Gy from 1/6/21 - 3/16/21. He tolerated radiation treatment without developing any grade 3 or higher acute toxicity as a result of his treatment.\par \par PSA\par 7/15/20  -  8.43 - pre-tx\par 4/15/21  -  0.09  - post tx.\par 9/15/21 -   0.16\par 2/15/22 -   0.04\par \par Patient presents today for follow up. He report nocturia twice per night, dysuria and weak flow. he denies any other urinary or bowel issues. He is not sexually active.

## 2022-05-18 ENCOUNTER — RX RENEWAL (OUTPATIENT)
Age: 77
End: 2022-05-18

## 2022-08-12 ENCOUNTER — APPOINTMENT (OUTPATIENT)
Dept: NEPHROLOGY | Facility: CLINIC | Age: 77
End: 2022-08-12

## 2022-08-12 VITALS — HEART RATE: 50 BPM | SYSTOLIC BLOOD PRESSURE: 135 MMHG | DIASTOLIC BLOOD PRESSURE: 63 MMHG

## 2022-08-12 PROCEDURE — 99214 OFFICE O/P EST MOD 30 MIN: CPT

## 2022-08-12 NOTE — HISTORY OF PRESENT ILLNESS
[FreeTextEntry1] : Referred by Dr. Lezama for CKD.\par \par * HTN controlled.  - 120s at home. No lightheadedness. No CP/SOB. Compliant with medications. * CKD stable, creatinine 1.45, on 6/4. HGA1c 6.6. * Off farxiga due to dysuriia when he had been it on 2 years, however he went to urologist and oncologist and was told he had no infection and this wasn't related to farxiga. They said this was related to radiation. \par \par Previous history (25Feb22): * Labs from 2/10 reviewed. CKD stable, creatinine 1.89. * * HTN controlled. No lightheadedness. Compliant with medications. * DM controlled, HGA1c 6.7. * Only at night after urinating he occasionally has decreased stream with a small amount of dysuria at the end of urination. No back pain or fever or chills. This has occurred for 3 weeks. \par \par Previous history (05Nov21): * CKD stable, creatinine 1.75. * HTN controlled.  - 120s at home. No lightheadedness. Compliant with medications. * No recent kidney stones. * DM borderline controlled, HGA1c 7.3. \par \par Previous history (02Jul21): * HTN controlled.  - 120s at home. No lightheadedness. Compliant with medications. * CKD stable, creatinine 1.63. * No recent kidney stones. * DM controlled,HGA1c 7. * 63 - 111 mg albuminuria. \par \par Previous history (29Mar): * Receiving radiation therapy for prostate cancer. * CKD stable, creatinine 1.87. Urine albumin 63. * Hyperkalemia improved on low K diet. * No recent kidney stones. * HTN controlled, BP 120s/70s. \par \par Previous history (25Jan21): * Dx with prostate cancer. Hormonal treatment started. No symptoms. * CKD stable, creatinine 1.9. * K increased to 5.6, he is now following strict low K diet. * No recent kidney stones. * On farxiga for proteinuria. * HTN controlled, 110 - 130s/70s. Average 120s/70s. \par \par Previous history (91Szp78): * Dx with prostate cancer. Hormonal treatment started. * CKD stable, creatinine 1.85 in September 2020. US 2018 no stones.  * Hyperkalemia improved. * No recent kidney stones. * 115 mg albuminuria.  * HTN borderline controlled.  \par \par Previous history (97Ujs35): * Scheduled for prostate biopsy under MAC with Dr. Hinojosa September 10th. . * CKD stable, 45Kti01 creatinine 1.87. * Hyperkalemia improved. * DM controlled. * 300 mg proteinuria. * No recent kidney stones. * HTN controlled at home, SBP 110s-120/70. He stopped torsemide and sodium bicarbonate. * No chest pain or SOB with 10 minutes+ of aerobic exercise daily. \par \par Previous history (04Jun20): * HTN controlled, BP 120s/70s. No lightheadedness. * CKD stable, creatinine 1.87. * He is following up with urologist for elevated PSA. * K increased to 5.5. He has been eating bananas (despite being counseled not to) and will now stop. * DM controlled, HGA1c 6.6. * No kidney stone for 4 years. Attempting to drink 2-3 liters daily. \par \par Previous history (44Pru98): * He is following up with urologist for elevated PSA. * CKD relatively stable, creatinine up to 1.85. * HTN controlled. Average /70s at home No lightheadedness. Compliant with medications. DM controlled, HGA1c 6.5. * Albuminuria 132 mg/g. \par \par Previous history (25Sep19): * Elevated PSA to 7.31. He is following up closely with urologist. * CKD stable, creatinine 1.85. * HTN controlled. No lightheadedness. Compliant with medications. * Slight rhinitis. \par \par Previous history (20Aug19): * CKD stable, creatinine 1.83. * BP 130s at home. He decreased diuretic to every other day. * Urine protein decreased to 300 mg/g. \par \par Previous history (88Tgm14): * CKD stable, creatinine 1.89 last visit. * HTN previously uncontrolled but has decreased to 120/70s at home on lisinopril. *  Urine protein decreased to 400 mg/g. * \par \par Previous history (02Apr19): * CKD stable, creatinine 1.84. * DM uncontrolled. HGA1c 7.4. \par \par Previous history (29Jan19): * HTN borderline controlled. /80s  at home. No lightheadedness. Compliant with medications. Following low salt diet.  * In 2016 he had stenting of left renal artery. * He requires omeprazole for severe GERD and ranitidine has been ineffective. * Urine albumin 131 mg/g.* CKD stable, creatinine 1.84.  * He is following up closely with urologist for elevated PSA. * DM controlled, HGA1c 6.8. *  24 hour urine on 2/18 showed low urine citrate. His last kidney stone was in 2016. 11/17 US did not show nephrolithiasis. He was unable to tolerate potassium citrate due to GERD. \par \par Options for clinical preventative services\par \par Covid vaccine: Moderna March 2021; Booster Covid 2021. \par Influenza: september 2020; September 2021 \par Pneumonia:  pneumovax [id number] \par Shingles: 2019 x 2\par TDAP:\par Colonoscopy: March 2020\par PSA: by urologist \par Dermatologist: Advised to see yearly\par \par \par \par

## 2022-08-12 NOTE — ASSESSMENT
[FreeTextEntry1] : # CKD stage.\par * Restart farxiga.\par * Therapies for kidney disease: blood pressure control; DM control; proteinuria reduction with ARB/ACEi; SGLT2 inhibitor; other evidence-based therapies including exercise, a plant-based lower oxalate diet, and 400 mcg folic acid daily\par * Cardiovascular disease prevention: counseling on healthy diet, physical activity, weight loss, alcohol limitation, blood pressure control; moderate intensity statin therapy\par * The patient has been counseled that chronic kidney disease is a significant condition and regular office followup with me (at least every 1 months for now) is important for monitoring and their health, and that it is their responsibility to make a follow up appointment.\par * The patient has been counseled never to stop taking their medications without discussing it with me or another doctor.\par * The patient has been counseled on avoiding NSAIDs.\par * The patient has been counseled on risk of acute renal failure and instructed to immediately call and speak with me or go immediately to ER with any severe symptoms, nausea, vomiting, diarrhea, chest pain, or shortness of breath.\par * A counseling information sheet has been given (today or previously). All their questions were answered.\par \par # Prostate cancer.\par * Follow up with oncology. \par \par # DM.\par * Follow up HGA1c.\par \par # HTN borderline controlled.\par * Cont amlodipine, lisinopril, metoprolol. Follow BP at on farxiga.\par * The patient's blood pressure was checked with the [employer] [id number] using the SPRINT trial protocol after sitting quietly in an empty room with arm supported, back supported, and feet on the floor for 5 minutes. The average of 3 readings were taken.\par * A counseling information sheet on blood pressure and staying healthy which they have been instructed to read has been given.\par * The patient has been counseled to check their BP at home with an automatic arm cuff, write down the readings, and reach me directly on the phone immediately if they are persistently > 180 systolic or if SBP is less than 100 or if lightheadedness develops. They were counseled to bring in all blood pressure readings and medications next visit.\par * The patient has been counseled that regular office followup (at least every 1 months for now)  is important for monitoring and for their health, and that it is their responsibility to make follow up appointments.\par * The patient also has been counseled that they must never stop or change any medications without discussing this with me (or another physician). \par \par \par \par

## 2022-09-15 ENCOUNTER — APPOINTMENT (OUTPATIENT)
Dept: RADIATION ONCOLOGY | Facility: CLINIC | Age: 77
End: 2022-09-15

## 2022-09-29 NOTE — REVIEW OF SYSTEMS
[IPSS Score (0-40): ___] : IPSS score: [unfilled] [EPIC-CP Score (0-60): ___] : EPIC-CP score: [unfilled] [Negative] : Psychiatric [Hematuria: Grade 0] : Hematuria: Grade 0 [Urinary Incontinence: Grade 0] : Urinary Incontinence: Grade 0  [Urinary Retention: Grade 0] : Urinary Retention: Grade 0 [Urinary Tract Pain: Grade 0] : Urinary Tract Pain: Grade 0 [Urinary Urgency: Grade 0] : Urinary Urgency: Grade 0 [Urinary Frequency: Grade 0] : Urinary Frequency: Grade 0 [Ejaculation Disorder: Grade 1 - Diminished ejaculation] : Ejaculation Disorder: Grade 1 - Diminished ejaculation  [Erectile Dysfunction: Grade 1 - Decrease in erectile function (frequency or rigidity of erections) but intervention not indicated (e.g., medication or use of mechanical device, penile pump)] : Erectile Dysfunction: Grade 1 - Decrease in erectile function (frequency or rigidity of erections) but intervention not indicated (e.g., medication or use of mechanical device, penile pump)

## 2022-09-30 ENCOUNTER — APPOINTMENT (OUTPATIENT)
Dept: RADIATION ONCOLOGY | Facility: CLINIC | Age: 77
End: 2022-09-30

## 2022-09-30 VITALS
BODY MASS INDEX: 27.81 KG/M2 | HEIGHT: 66 IN | OXYGEN SATURATION: 100 % | RESPIRATION RATE: 17 BRPM | TEMPERATURE: 97.2 F | SYSTOLIC BLOOD PRESSURE: 149 MMHG | WEIGHT: 173.06 LBS | DIASTOLIC BLOOD PRESSURE: 83 MMHG | HEART RATE: 45 BPM

## 2022-09-30 PROCEDURE — 99214 OFFICE O/P EST MOD 30 MIN: CPT

## 2022-09-30 RX ORDER — TAMSULOSIN HYDROCHLORIDE 0.4 MG/1
0.4 CAPSULE ORAL
Qty: 60 | Refills: 3 | Status: DISCONTINUED | COMMUNITY
Start: 2021-01-19 | End: 2022-09-30

## 2022-09-30 RX ORDER — DIAZEPAM 5 MG/1
5 TABLET ORAL
Qty: 1 | Refills: 0 | Status: DISCONTINUED | COMMUNITY
Start: 2020-11-09 | End: 2022-09-30

## 2022-09-30 NOTE — DISEASE MANAGEMENT
[1] : T1 [0] : M0 [0-10] : 0 -10 ng/mL [8] : Fusion Biopsy Joey Score: 8 [5] : 5 [IIIB] : IIIB [MeasuredProstateVolume] : 79 [TotalCores] : 14 [TotalPositiveCores] : 5 [MaxCoreInvolvement] : 100

## 2022-09-30 NOTE — PHYSICAL EXAM
[Normal] : well developed, well nourished, in no acute distress [] : no respiratory distress [Respiration, Rhythm And Depth] : normal respiratory rhythm and effort [Exaggerated Use Of Accessory Muscles For Inspiration] : no accessory muscle use [Abdomen Soft] : soft [Skin Color & Pigmentation] : normal skin color and pigmentation [No Focal Deficits] : no focal deficits [Oriented To Time, Place, And Person] : oriented to person, place, and time [Sclera] : the sclera and conjunctiva were normal [Extraocular Movements] : extraocular movements were intact [Arterial Pulses Normal] : the arterial pulses were normal [Nondistended] : nondistended [Nail Clubbing] : no clubbing  or cyanosis of the fingernails [Range of Motion to Joints] : range of motion to joints [Motor Tone] : muscle strength and tone were normal [Affect] : the affect was normal [Not Anxious] : not anxious

## 2022-09-30 NOTE — HISTORY OF PRESENT ILLNESS
[FreeTextEntry1] : Mr. Pruitt is a  75 year old male with history of AAA s/p repair in 2017, renal insufficiency, right renal artery stenosis, ureterolithotripsy, retinal detachment surgery and now with high risk prostate cancer, [place] 4+4=8 and pretreatment PSA of 8.43.  He underwent EBRT to a dose of 8100 Gy from 1/6/21 - 3/16/21. Of note, patient states that he received ADT injections with uro (Dec. 2020-March 2021). \par \par He tolerated radiation treatment without developing any grade 3 or higher acute toxicity as a result of his treatment.\par \par PSA\par 7/15/20 - 8.43 - pre-tx\par 4/15/21 - 0.09  - post tx.\par 9/15/21 - 0.16\par 2/15/22 - 0.15 \par 6/2/22 - 0.16\par 9/22/22 - 0.11\par \par Last follow up with Dr. Inman 3/2022-  reported nocturia  2x, dysuria and weak flow. Not sexually active.\par \par Today 9/30/22: Seen for followup/transition of care to new MD. \par Feels generally well.  nocturia 0-2x , good  stream, occasional urgency. Denies leakage, pain, hematuria or  bowel symptoms.  Not taking tamsulosin regularly. Not sexually active. IPSS/EPIC Score 6/11. Followed up with uro (Dr. Covington at [facility]) March 2022 and follows nephro for CKD.

## 2022-10-06 ENCOUNTER — APPOINTMENT (OUTPATIENT)
Dept: ENDOCRINOLOGY | Facility: CLINIC | Age: 77
End: 2022-10-06

## 2022-10-06 VITALS
BODY MASS INDEX: 27.12 KG/M2 | SYSTOLIC BLOOD PRESSURE: 144 MMHG | HEART RATE: 44 BPM | WEIGHT: 168 LBS | DIASTOLIC BLOOD PRESSURE: 87 MMHG

## 2022-10-06 LAB — GLUCOSE BLDC GLUCOMTR-MCNC: 88

## 2022-10-06 PROCEDURE — 99204 OFFICE O/P NEW MOD 45 MIN: CPT | Mod: 25

## 2022-10-06 PROCEDURE — 82962 GLUCOSE BLOOD TEST: CPT

## 2022-10-06 PROCEDURE — 83036 HEMOGLOBIN GLYCOSYLATED A1C: CPT | Mod: QW

## 2022-10-06 NOTE — ASSESSMENT
[FreeTextEntry1] : DM, type 2, adequate glycemic control.\par CKD.\par \par Will continue current tx.\par Medications refilled.\par F/U - 3-6 months.

## 2022-10-06 NOTE — HISTORY OF PRESENT ILLNESS
[FreeTextEntry1] : 76 y.o. male with a h/o type 2 DM since 2016.\par The patient is currently on tx with Farxiga.\par He feels well.\par His weight is stable.\par On 9/26/22 HbA1C was 7%.\par HbA1C today is 6.7%, glucose -88 mg/dl.

## 2022-10-14 ENCOUNTER — APPOINTMENT (OUTPATIENT)
Dept: NEPHROLOGY | Facility: CLINIC | Age: 77
End: 2022-10-14

## 2022-10-14 VITALS — DIASTOLIC BLOOD PRESSURE: 59 MMHG | SYSTOLIC BLOOD PRESSURE: 123 MMHG | HEART RATE: 50 BPM

## 2022-10-14 PROCEDURE — 99214 OFFICE O/P EST MOD 30 MIN: CPT

## 2022-10-14 NOTE — PHYSICAL EXAM
[General Appearance - Alert] : alert [General Appearance - In No Acute Distress] : in no acute distress [Neck Appearance] : the appearance of the neck was normal [Neck Cervical Mass (___cm)] : no neck mass was observed [Jugular Venous Distention Increased] : there was no jugular-venous distention [Thyroid Diffuse Enlargement] : the thyroid was not enlarged [Thyroid Nodule] : there were no palpable thyroid nodules [Auscultation Breath Sounds / Voice Sounds] : lungs were clear to auscultation bilaterally [Heart Rate And Rhythm] : heart rate was normal and rhythm regular [Heart Sounds] : normal S1 and S2 [Heart Sounds Gallop] : no gallops [Murmurs] : no murmurs [Heart Sounds Pericardial Friction Rub] : no pericardial rub [Veins - Varicosity Changes] : there were no varicosital changes [FreeTextEntry1] : trace ankle edema  [Bowel Sounds] : normal bowel sounds [Abdomen Soft] : soft [Abdomen Tenderness] : non-tender [] : no hepato-splenomegaly [Abdomen Mass (___ Cm)] : no abdominal mass palpated [Cervical Lymph Nodes Enlarged Posterior Bilaterally] : posterior cervical [Cervical Lymph Nodes Enlarged Anterior Bilaterally] : anterior cervical [Supraclavicular Lymph Nodes Enlarged Bilaterally] : supraclavicular

## 2022-10-14 NOTE — ASSESSMENT
[FreeTextEntry1] : \par # CKD stage 3.\par * Restart farxiga.\par * Therapies for kidney disease: blood pressure control; DM control; proteinuria reduction with ARB/ACEi; SGLT2 inhibitor; other evidence-based therapies including exercise, a plant-based lower oxalate diet, and 400 mcg folic acid daily\par * Cardiovascular disease prevention: counseling on healthy diet, physical activity, weight loss, alcohol limitation, blood pressure control; moderate intensity statin therapy\par * The patient has been counseled that chronic kidney disease is a significant condition and regular office followup with me (at least every 1 months for now) is important for monitoring and their health, and that it is their responsibility to make a follow up appointment.\par * The patient has been counseled never to stop taking their medications without discussing it with me or another doctor.\par * The patient has been counseled on avoiding NSAIDs.\par * The patient has been counseled on risk of acute renal failure and instructed to immediately call and speak with me or go immediately to ER with any severe symptoms, nausea, vomiting, diarrhea, chest pain, or shortness of breath.\par * A counseling information sheet has been given (today or previously). All their questions were answered.\par \par # Prostate cancer.\par * Follow up with oncology. \par \par # DM.\par * Follow up HGA1c.\par * Follow up with Dr. Lezama. \par \par # HTN controlled. \par * Cont amlodipine, lisinopril, metoprolol. Follow BP at on farxiga.\par * The patient's blood pressure was checked with the [employer] [id number]-[id number] using the SPRINT trial protocol after sitting quietly in an empty room with arm supported, back supported, and feet on the floor for 5 minutes. The average of 3 readings were taken.\par * A counseling information sheet on blood pressure and staying healthy which they have been instructed to read has been given.\par * The patient has been counseled to check their BP at home with an automatic arm cuff, write down the readings, and reach me directly on the phone immediately if they are persistently > 180 systolic or if SBP is less than 100 or if lightheadedness develops. They were counseled to bring in all blood pressure readings and medications next visit.\par * The patient has been counseled that regular office followup (at least every 1 months for now) is important for monitoring and for their health, and that it is their responsibility to make follow up appointments.\par * The patient also has been counseled that they must never stop or change any medications without discussing this with me (or another physician). \par \par

## 2022-10-14 NOTE — HISTORY OF PRESENT ILLNESS
[FreeTextEntry1] : Referred by Dr. Lezama for CKD. PCP: Dr. RAMESH.\par \par *  HTN controlled. BP 120s. No lightheadedness. No CP/SOB. Compliant with medications. * CKD stable, creatinine 1.6. * DM controlled 7. * 112 mg/d. * No urinary symptoms on farxiga. \par \par Previous history (12Aug22): * HTN controlled.  - 120s at home. No lightheadedness. No CP/SOB. Compliant with medications. * CKD stable, creatinine 1.45, on 6/4. HGA1c 6.6. * Off farxiga due to dysuriia when he had been it on 2 years, however he went to urologist and oncologist and was told he had no infection and this wasn't related to farxiga. They said this was related to radiation. \par \par Previous history (25Feb22): * Labs from 2/10 reviewed. CKD stable, creatinine 1.89. * * HTN controlled. No lightheadedness. Compliant with medications. * DM controlled, HGA1c 6.7. * Only at night after urinating he occasionally has decreased stream with a small amount of dysuria at the end of urination. No back pain or fever or chills. This has occurred for 3 weeks. \par \par Previous history (05Nov21): * CKD stable, creatinine 1.75. * HTN controlled.  - 120s at home. No lightheadedness. Compliant with medications. * No recent kidney stones. * DM borderline controlled, HGA1c 7.3. \par \par Previous history (02Jul21): * HTN controlled.  - 120s at home. No lightheadedness. Compliant with medications. * CKD stable, creatinine 1.63. * No recent kidney stones. * DM controlled,HGA1c 7. * 63 - 111 mg albuminuria. \par \par Previous history (29Mar): * Receiving radiation therapy for prostate cancer. * CKD stable, creatinine 1.87. Urine albumin 63. * Hyperkalemia improved on low K diet. * No recent kidney stones. * HTN controlled, BP 120s/70s. \par \par Previous history (25Jan21): * Dx with prostate cancer. Hormonal treatment started. No symptoms. * CKD stable, creatinine 1.9. * K increased to 5.6, he is now following strict low K diet. * No recent kidney stones. * On farxiga for proteinuria. * HTN controlled, 110 - 130s/70s. Average 120s/70s. \par \par Previous history ([id number]): * Dx with prostate cancer. Hormonal treatment started. * CKD stable, creatinine 1.85 in September 2020. US 2018 no stones.  * Hyperkalemia improved. * No recent kidney stones. * 115 mg albuminuria.  * HTN borderline controlled.  \par \par Previous history ([id number]): * Scheduled for prostate biopsy under MAC with Dr. Hinojosa September 10th. . * CKD stable, [id number] creatinine 1.87. * Hyperkalemia improved. * DM controlled. * 300 mg proteinuria. * No recent kidney stones. * HTN controlled at home, SBP 110s-120/70. He stopped torsemide and sodium bicarbonate. * No chest pain or SOB with 10 minutes+ of aerobic exercise daily. \par \par Previous history (04Jun20): * HTN controlled, BP 120s/70s. No lightheadedness. * CKD stable, creatinine 1.87. * He is following up with urologist for elevated PSA. * K increased to 5.5. He has been eating bananas (despite being counseled not to) and will now stop. * DM controlled, HGA1c 6.6. * No kidney stone for 4 years. Attempting to drink 2-3 liters daily. \par \par Previous history ([id number]): * He is following up with urologist for elevated PSA. * CKD relatively stable, creatinine up to 1.85. * HTN controlled. Average /70s at home No lightheadedness. Compliant with medications. DM controlled, HGA1c 6.5. * Albuminuria 132 mg/g. \par \par Previous history ([id number]): * Elevated PSA to 7.31. He is following up closely with urologist. * CKD stable, creatinine 1.85. * HTN controlled. No lightheadedness. Compliant with medications. * Slight rhinitis. \par \par Previous history ([id number]): * CKD stable, creatinine 1.83. * BP 130s at home. He decreased diuretic to every other day. * Urine protein decreased to 300 mg/g. \par \par Previous history (07May19): * CKD stable, creatinine 1.89 last visit. * HTN previously uncontrolled but has decreased to 120/70s at home on lisinopril. *  Urine protein decreased to 400 mg/g. * \par \par Previous history (02Apr19): * CKD stable, creatinine 1.84. * DM uncontrolled. HGA1c 7.4. \par \par Previous history (29Jan19): * HTN borderline controlled. /80s  at home. No lightheadedness. Compliant with medications. Following low salt diet.  * In 2016 he had stenting of left renal artery. * He requires omeprazole for severe GERD and ranitidine has been ineffective. * Urine albumin 131 mg/g.* CKD stable, creatinine 1.84.  * He is following up closely with urologist for elevated PSA. * DM controlled, HGA1c 6.8. *  24 hour urine on 2/18 showed low urine citrate. His last kidney stone was in 2016. 11/17 US did not show nephrolithiasis. He was unable to tolerate potassium citrate due to GERD. \par \par \par \par

## 2022-11-09 ENCOUNTER — RX RENEWAL (OUTPATIENT)
Age: 77
End: 2022-11-09

## 2023-02-24 ENCOUNTER — APPOINTMENT (OUTPATIENT)
Dept: NEPHROLOGY | Facility: CLINIC | Age: 78
End: 2023-02-24
Payer: MEDICARE

## 2023-02-24 VITALS — DIASTOLIC BLOOD PRESSURE: 82 MMHG | SYSTOLIC BLOOD PRESSURE: 141 MMHG | HEART RATE: 52 BPM

## 2023-02-24 VITALS — SYSTOLIC BLOOD PRESSURE: 122 MMHG | DIASTOLIC BLOOD PRESSURE: 62 MMHG | HEART RATE: 50 BPM

## 2023-02-24 VITALS — HEART RATE: 49 BPM

## 2023-02-24 PROCEDURE — 99214 OFFICE O/P EST MOD 30 MIN: CPT

## 2023-02-24 NOTE — HISTORY OF PRESENT ILLNESS
[FreeTextEntry1] : Referred by Dr. Lezama for CKD. PCP: Dr. RAMESH.\par \par *  HTN controlled. BP 110s at home. No lightheadedness. No CP/SOB. Compliant with medications. * CKD stable, creatinine 1.73 in 2/19/23. * DM controlled. HGA1c 7. Alb 110. \par \par Previous history (14Oct22): *  HTN controlled. BP 120s. No lightheadedness. No CP/SOB. Compliant with medications. * CKD stable, creatinine 1.6. * DM controlled 7. * 112 mg/d. * No urinary symptoms on farxiga. \par \par Previous history (12Aug22): * HTN controlled.  - 120s at home. No lightheadedness. No CP/SOB. Compliant with medications. * CKD stable, creatinine 1.45, on 6/4. HGA1c 6.6. * Off farxiga due to dysuriia when he had been it on 2 years, however he went to urologist and oncologist and was told he had no infection and this wasn't related to farxiga. They said this was related to radiation. \par \par Previous history (25Feb22): * Labs from 2/10 reviewed. CKD stable, creatinine 1.89. * * HTN controlled. No lightheadedness. Compliant with medications. * DM controlled, HGA1c 6.7. * Only at night after urinating he occasionally has decreased stream with a small amount of dysuria at the end of urination. No back pain or fever or chills. This has occurred for 3 weeks. \par \par Previous history (05Nov21): * CKD stable, creatinine 1.75. * HTN controlled.  - 120s at home. No lightheadedness. Compliant with medications. * No recent kidney stones. * DM borderline controlled, HGA1c 7.3. \par \par Previous history ([id number]): * HTN controlled.  - 120s at home. No lightheadedness. Compliant with medications. * CKD stable, creatinine 1.63. * No recent kidney stones. * DM controlled,HGA1c 7. * 63 - 111 mg albuminuria. \par \par Previous history (29Mar): * Receiving radiation therapy for prostate cancer. * CKD stable, creatinine 1.87. Urine albumin 63. * Hyperkalemia improved on low K diet. * No recent kidney stones. * HTN controlled, BP 120s/70s. \par \par Previous history (25Jan21): * Dx with prostate cancer. Hormonal treatment started. No symptoms. * CKD stable, creatinine 1.9. * K increased to 5.6, he is now following strict low K diet. * No recent kidney stones. * On farxiga for proteinuria. * HTN controlled, 110 - 130s/70s. Average 120s/70s. \par \par Previous history (18Dec20): * Dx with prostate cancer. Hormonal treatment started. * CKD stable, creatinine 1.85 in September 2020. US 2018 no stones.  * Hyperkalemia improved. * No recent kidney stones. * 115 mg albuminuria.  * HTN borderline controlled.  \par \par Previous history (03Sep20): * Scheduled for prostate biopsy under MAC with Dr. Hinojosa September 10th. . * CKD stable, [id number] creatinine 1.87. * Hyperkalemia improved. * DM controlled. * 300 mg proteinuria. * No recent kidney stones. * HTN controlled at home, SBP 110s-120/70. He stopped torsemide and sodium bicarbonate. * No chest pain or SOB with 10 minutes+ of aerobic exercise daily. \par \par Previous history (04Jun20): * HTN controlled, BP 120s/70s. No lightheadedness. * CKD stable, creatinine 1.87. * He is following up with urologist for elevated PSA. * K increased to 5.5. He has been eating bananas (despite being counseled not to) and will now stop. * DM controlled, HGA1c 6.6. * No kidney stone for 4 years. Attempting to drink 2-3 liters daily. \par \par Previous history (18Dec19): * He is following up with urologist for elevated PSA. * CKD relatively stable, creatinine up to 1.85. * HTN controlled. Average /70s at home No lightheadedness. Compliant with medications. DM controlled, HGA1c 6.5. * Albuminuria 132 mg/g. \par \par Previous history (25Sep19): * Elevated PSA to 7.31. He is following up closely with urologist. * CKD stable, creatinine 1.85. * HTN controlled. No lightheadedness. Compliant with medications. * Slight rhinitis. \par \par Previous history (20Aug19): * CKD stable, creatinine 1.83. * BP 130s at home. He decreased diuretic to every other day. * Urine protein decreased to 300 mg/g. \par \par Previous history (07May19): * CKD stable, creatinine 1.89 last visit. * HTN previously uncontrolled but has decreased to 120/70s at home on lisinopril. *  Urine protein decreased to 400 mg/g. * \par \par Previous history (02Apr19): * CKD stable, creatinine 1.84. * DM uncontrolled. HGA1c 7.4. \par \par Previous history (29Jan19): * HTN borderline controlled. /80s  at home. No lightheadedness. Compliant with medications. Following low salt diet.  * In 2016 he had stenting of left renal artery. * He requires omeprazole for severe GERD and ranitidine has been ineffective. * Urine albumin 131 mg/g.* CKD stable, creatinine 1.84.  * He is following up closely with urologist for elevated PSA. * DM controlled, HGA1c 6.8. *  24 hour urine on 2/18 showed low urine citrate. His last kidney stone was in 2016. 11/17 US did not show nephrolithiasis. He was unable to tolerate potassium citrate due to GERD. \par \par \par \par

## 2023-02-24 NOTE — ASSESSMENT
[FreeTextEntry1] : \par # CKD stage 3.\par * Cont farxiga.\par * Check labs next visit. \par * Therapies for kidney disease: blood pressure control; DM control; proteinuria reduction with ARB/ACEi; SGLT2 inhibitor; other evidence-based therapies including exercise, a plant-based lower oxalate diet, and 400 mcg folic acid daily\par * Cardiovascular disease prevention: counseling on healthy diet, physical activity, weight loss, alcohol limitation, blood pressure control; moderate intensity statin therapy\par * The patient has been counseled that chronic kidney disease is a significant condition and regular office followup with me (at least every 3 months for now) is important for monitoring and their health, and that it is their responsibility to make a follow up appointment.\par * The patient has been counseled never to stop taking their medications without discussing it with me or another doctor.\par * The patient has been counseled on avoiding NSAIDs.\par * The patient has been counseled on risk of acute renal failure and instructed to immediately call and speak with me or go immediately to ER with any severe symptoms, nausea, vomiting, diarrhea, chest pain, or shortness of breath.\par * A counseling information sheet has been given (today or previously). All their questions were answered.\par \par # Prostate cancer.\par * Follow up with oncology. \par \par # DM.\par * Follow up HGA1c.\par * Follow up with Dr. Lezama. \par \par # HTN controlled. \par * Cont amlodipine, lisinopril, metoprolol. Follow BP at on farxiga.Reduce metoprolol to 50. \par * The patient's blood pressure was checked with the [employer] [id number]-[id number] using the SPRINT trial protocol after sitting quietly in an empty room with arm supported, back supported, and feet on the floor for 5 minutes. The average of 3 readings were taken.\par * A counseling information sheet on blood pressure and staying healthy which they have been instructed to read has been given.\par * The patient has been counseled to check their BP at home with an automatic arm cuff, write down the readings, and reach me directly on the phone immediately if they are persistently > 180 systolic or if SBP is less than 100 or if lightheadedness develops. They were counseled to bring in all blood pressure readings and medications next visit.\par * The patient has been counseled that regular office followup (at least every 3 months for now) is important for monitoring and for their health, and that it is their responsibility to make follow up appointments.\par * The patient also has been counseled that they must never stop or change any medications without discussing this with me (or another physician). \par \par

## 2023-03-22 NOTE — PHYSICAL EXAM
[Normal] : well developed, well nourished, in no acute distress [Sclera] : the sclera and conjunctiva were normal [Extraocular Movements] : extraocular movements were intact [] : no respiratory distress [Respiration, Rhythm And Depth] : normal respiratory rhythm and effort [Exaggerated Use Of Accessory Muscles For Inspiration] : no accessory muscle use [Abdomen Soft] : soft [Nondistended] : nondistended [Nail Clubbing] : no clubbing  or cyanosis of the fingernails [Range of Motion to Joints] : range of motion to joints [Skin Color & Pigmentation] : normal skin color and pigmentation [No Focal Deficits] : no focal deficits [Oriented To Time, Place, And Person] : oriented to person, place, and time [Affect] : the affect was normal

## 2023-03-23 ENCOUNTER — APPOINTMENT (OUTPATIENT)
Dept: RADIATION ONCOLOGY | Facility: CLINIC | Age: 78
End: 2023-03-23
Payer: MEDICARE

## 2023-03-23 VITALS
SYSTOLIC BLOOD PRESSURE: 151 MMHG | OXYGEN SATURATION: 98 % | DIASTOLIC BLOOD PRESSURE: 80 MMHG | TEMPERATURE: 97.16 F | HEIGHT: 66 IN | HEART RATE: 53 BPM | WEIGHT: 168 LBS | BODY MASS INDEX: 27 KG/M2 | RESPIRATION RATE: 17 BRPM

## 2023-03-23 PROCEDURE — 99213 OFFICE O/P EST LOW 20 MIN: CPT

## 2023-03-23 RX ORDER — TAMSULOSIN HYDROCHLORIDE 0.4 MG/1
0.4 CAPSULE ORAL
Qty: 60 | Refills: 2 | Status: DISCONTINUED | COMMUNITY
Start: 2022-03-10 | End: 2023-03-23

## 2023-03-23 NOTE — REVIEW OF SYSTEMS
[IPSS Score (0-40): ___] : IPSS score: [unfilled] [EPIC-CP Score (0-60): ___] : EPIC-CP score: [unfilled] [Negative] : Psychiatric [Hematuria: Grade 0] : Hematuria: Grade 0 [Urinary Incontinence: Grade 0] : Urinary Incontinence: Grade 0  [Urinary Retention: Grade 0] : Urinary Retention: Grade 0 [Urinary Tract Pain: Grade 0] : Urinary Tract Pain: Grade 0 [Ejaculation Disorder: Grade 1 - Diminished ejaculation] : Ejaculation Disorder: Grade 1 - Diminished ejaculation  [Constipation: Grade 0] : Constipation: Grade 0 [Diarrhea: Grade 0] : Diarrhea: Grade 0 [Rectal Pain: Grade 0] : Rectal Pain: Grade 0 [Urinary Urgency: Grade 1 - Present] : Urinary Urgency: Grade 1 - Present [Urinary Frequency: Grade 1 - Present] : Urinary Frequency: Grade 1 - Present [Erectile Dysfunction: Grade 2 - Decrease in erectile function (frequency/rigidity of erections), erectile intervention indicated, (e.g., medication or mechanical devices such as penile pump)] : Erectile Dysfunction: Grade 2 - Decrease in erectile function (frequency/rigidity of erections), erectile intervention indicated, (e.g., medication or mechanical devices such as penile pump)

## 2023-03-23 NOTE — HISTORY OF PRESENT ILLNESS
[FreeTextEntry1] : Mr. Pruitt is a 77 year old male with history of AAA s/p repair in 2017, renal insufficiency, right renal artery stenosis, ureterolithotripsy, retinal detachment surgery and now with high risk prostate cancer, [place] 4+4=8 and pretreatment PSA of 8.43.  He underwent EBRT to a dose of 8100 Gy from 1/6/21 - 3/16/21. Of note, patient states that he received ADT injections with uro (Dec. 2020-March 2021). \par \par He tolerated radiation treatment without developing any grade 3 or higher acute toxicity as a result of his treatment.\par \par PSA\par 7/15/20 - 8.43 - pre-tx\par ->3/16/21 RT\par ->12/ 2020-3/2021ADT\par 4/15/21 - 0.09  - post tx.\par 9/15/21 - 0.16\par 2/15/22 - 0.15 \par 6/2/22 - 0.16\par 9/22/22 - 0.11\par 2/16/23- 0.11\par \par follow up with Dr. Inman 3/2022-  reported nocturia  2x, dysuria and weak flow. Not sexually active.\par \par 9/30/22: Seen for followup/transition of care to new MD. \par Feels generally well.  nocturia 0-2x , good  stream, occasional urgency. Denies leakage, pain, hematuria or  bowel symptoms.  Not taking tamsulosin regularly. Not sexually active. IPSS/EPIC Score 6/11. Followed up with uro (Dr. Covington at [facility]) March 2022 and follows nephro for CKD. \par \par 3/23/23: Follow up\par Feels generally well and offers no acute complaints. Nocturia 0-2x, good stream. Rare dribbling with urgency. Denies dysuria, hematuria or bowel symptoms. Discontinued tamsulosin 5-6 months ago without worsening.  Not doing kegels and not sexually active without issue. No scheduled f/u with uro. IPSS/QOL/EPIC Score.2/2/8

## 2023-04-24 ENCOUNTER — APPOINTMENT (OUTPATIENT)
Dept: ENDOCRINOLOGY | Facility: CLINIC | Age: 78
End: 2023-04-24
Payer: MEDICARE

## 2023-04-24 VITALS
DIASTOLIC BLOOD PRESSURE: 73 MMHG | HEART RATE: 55 BPM | BODY MASS INDEX: 27.32 KG/M2 | WEIGHT: 170 LBS | HEIGHT: 66 IN | SYSTOLIC BLOOD PRESSURE: 128 MMHG

## 2023-04-24 LAB — GLUCOSE BLDC GLUCOMTR-MCNC: 114

## 2023-04-24 PROCEDURE — 99214 OFFICE O/P EST MOD 30 MIN: CPT | Mod: 25

## 2023-04-24 PROCEDURE — 82962 GLUCOSE BLOOD TEST: CPT

## 2023-04-24 NOTE — HISTORY OF PRESENT ILLNESS
[FreeTextEntry1] : 76 y.o. male with a h/o type 2 DM since 2016.\par The patient is currently on tx with Farxiga.\par He feels well.\par His weight is stable.\par On 9/26/22 HbA1C was 7%.\par HbA1C today is 6.7%, glucose -88 mg/dl.\par 4/24/23. The patient is doing well - has no complaints.\par His weight is stable.\par HbA1C was 7% on 2/16/23.\par Glucose today - 114 mg/dl.

## 2023-04-24 NOTE — ASSESSMENT
[FreeTextEntry1] : DM, type 2.\par Hyperlipidemia.\par Prostate CA.\par \par Will continue current management.\par Medications refilled.\par F/U - 3 months

## 2023-04-25 ENCOUNTER — APPOINTMENT (OUTPATIENT)
Dept: ENDOCRINOLOGY | Facility: CLINIC | Age: 78
End: 2023-04-25

## 2023-05-30 ENCOUNTER — APPOINTMENT (OUTPATIENT)
Dept: NEPHROLOGY | Facility: CLINIC | Age: 78
End: 2023-05-30
Payer: MEDICARE

## 2023-05-30 VITALS — DIASTOLIC BLOOD PRESSURE: 66 MMHG | HEART RATE: 57 BPM | SYSTOLIC BLOOD PRESSURE: 123 MMHG

## 2023-05-30 PROCEDURE — 99214 OFFICE O/P EST MOD 30 MIN: CPT

## 2023-05-30 RX ORDER — SODIUM BICARBONATE 650 MG/1
650 TABLET ORAL
Qty: 180 | Refills: 3 | Status: ACTIVE | COMMUNITY
Start: 2023-05-30 | End: 1900-01-01

## 2023-05-30 NOTE — ASSESSMENT
[FreeTextEntry1] : # CKD stage 3.\par * Cont farxiga.\par * Check labs next visit. \par * Therapies for kidney disease: blood pressure control; DM control; proteinuria reduction with ARB/ACEi; SGLT2 inhibitor; other evidence-based therapies including exercise, a plant-based lower oxalate diet, and 400 mcg folic acid daily\par * Cardiovascular disease prevention: counseling on healthy diet, physical activity, weight loss, alcohol limitation, blood pressure control; moderate intensity statin therapy\par * The patient has been counseled that chronic kidney disease is a significant condition and regular office followup with me (at least every 3 months for now) is important for monitoring and their health, and that it is their responsibility to make a follow up appointment.\par * The patient has been counseled never to stop taking their medications without discussing it with me or another doctor.\par * The patient has been counseled on avoiding NSAIDs.\par * The patient has been counseled on risk of acute renal failure and instructed to immediately call and speak with me or go immediately to ER with any severe symptoms, nausea, vomiting, diarrhea, chest pain, or shortness of breath.\par * A counseling information sheet has been given (today or previously). All their questions were answered.\par \par # Prostate cancer.\par * Follow up with oncology. \par \par # DM.\par * Follow up HGA1c.\par * Follow up with Dr. Lezama. \par \par # HTN controlled. \par * Cont amlodipine, lisinopril, metoprolol. \par * The patient's blood pressure was checked with the [employer] [id number] using the SPRINT trial protocol after sitting quietly in an empty room with arm supported, back supported, and feet on the floor for 5 minutes. The average of 3 readings were taken.\par * A counseling information sheet on blood pressure and staying healthy which they have been instructed to read has been given.\par * The patient has been counseled to check their BP at home with an automatic arm cuff, write down the readings, and reach me directly on the phone immediately if they are persistently > 180 systolic or if SBP is less than 100 or if lightheadedness develops. They were counseled to bring in all blood pressure readings and medications next visit.\par * The patient has been counseled that regular office followup (at least every 3 months for now) is important for monitoring and for their health, and that it is their responsibility to make follow up appointments.\par * The patient also has been counseled that they must never stop or change any medications without discussing this with me (or another physician). \par \par # Kidney stones.\par * Maintain high fluid intake.\par * Check US.

## 2023-05-30 NOTE — HISTORY OF PRESENT ILLNESS
[FreeTextEntry1] : Referred by Dr. Lezama for CKD. PCP: Dr. RAMESH.\par \par * Cr 1.79, eGFR 39 on 18May23. BP controlled. BP 110s at home. No lightheadedness. No CP/SOB. Compliant with medications. * * DM controlled, HGA1c 7. * 63 mg albuminuria. * No renal colic. Last in 2015. \par \par Previous history (24Feb23): *  HTN controlled. BP 110s at home. No lightheadedness. No CP/SOB. Compliant with medications. * CKD stable, creatinine 1.73 in 2/19/23. * DM controlled. HGA1c 7. Alb 110. \par \par Previous history (14Oct22): *  HTN controlled. BP 120s. No lightheadedness. No CP/SOB. Compliant with medications. * CKD stable, creatinine 1.6. * DM controlled 7. * 112 mg/d. * No urinary symptoms on farxiga. \par \par Previous history (12Aug22): * HTN controlled.  - 120s at home. No lightheadedness. No CP/SOB. Compliant with medications. * CKD stable, creatinine 1.45, on 6/4. HGA1c 6.6. * Off farxiga due to dysuriia when he had been it on 2 years, however he went to urologist and oncologist and was told he had no infection and this wasn't related to farxiga. They said this was related to radiation. \par \par Previous history (25Feb22): * Labs from 2/10 reviewed. CKD stable, creatinine 1.89. * * HTN controlled. No lightheadedness. Compliant with medications. * DM controlled, HGA1c 6.7. * Only at night after urinating he occasionally has decreased stream with a small amount of dysuria at the end of urination. No back pain or fever or chills. This has occurred for 3 weeks. \par \par Previous history (05Nov21): * CKD stable, creatinine 1.75. * HTN controlled.  - 120s at home. No lightheadedness. Compliant with medications. * No recent kidney stones. * DM borderline controlled, HGA1c 7.3. \par \par Previous history (16Vts56): * HTN controlled.  - 120s at home. No lightheadedness. Compliant with medications. * CKD stable, creatinine 1.63. * No recent kidney stones. * DM controlled,HGA1c 7. * 63 - 111 mg albuminuria. \par \par Previous history (29Mar): * Receiving radiation therapy for prostate cancer. * CKD stable, creatinine 1.87. Urine albumin 63. * Hyperkalemia improved on low K diet. * No recent kidney stones. * HTN controlled, BP 120s/70s. \par \par Previous history (25Jan21): * Dx with prostate cancer. Hormonal treatment started. No symptoms. * CKD stable, creatinine 1.9. * K increased to 5.6, he is now following strict low K diet. * No recent kidney stones. * On farxiga for proteinuria. * HTN controlled, 110 - 130s/70s. Average 120s/70s. \par \par Previous history (01Rdc97): * Dx with prostate cancer. Hormonal treatment started. * CKD stable, creatinine 1.85 in September 2020. US 2018 no stones.  * Hyperkalemia improved. * No recent kidney stones. * 115 mg albuminuria.  * HTN borderline controlled.  \par \par Previous history (29Wjk60): * Scheduled for prostate biopsy under MAC with Dr. Hinojosa September 10th. . * CKD stable, [id number] creatinine 1.87. * Hyperkalemia improved. * DM controlled. * 300 mg proteinuria. * No recent kidney stones. * HTN controlled at home, SBP 110s-120/70. He stopped torsemide and sodium bicarbonate. * No chest pain or SOB with 10 minutes+ of aerobic exercise daily. \par \par Previous history (04Jun20): * HTN controlled, BP 120s/70s. No lightheadedness. * CKD stable, creatinine 1.87. * He is following up with urologist for elevated PSA. * K increased to 5.5. He has been eating bananas (despite being counseled not to) and will now stop. * DM controlled, HGA1c 6.6. * No kidney stone for 4 years. Attempting to drink 2-3 liters daily. \par \par Previous history (39Zje71): * He is following up with urologist for elevated PSA. * CKD relatively stable, creatinine up to 1.85. * HTN controlled. Average /70s at home No lightheadedness. Compliant with medications. DM controlled, HGA1c 6.5. * Albuminuria 132 mg/g. \par \par Previous history (25Sep19): * Elevated PSA to 7.31. He is following up closely with urologist. * CKD stable, creatinine 1.85. * HTN controlled. No lightheadedness. Compliant with medications. * Slight rhinitis. \par \par Previous history (20Aug19): * CKD stable, creatinine 1.83. * BP 130s at home. He decreased diuretic to every other day. * Urine protein decreased to 300 mg/g. \par \par Previous history (07May19): * CKD stable, creatinine 1.89 last visit. * HTN previously uncontrolled but has decreased to 120/70s at home on lisinopril. *  Urine protein decreased to 400 mg/g. * \par \par Previous history (02Apr19): * CKD stable, creatinine 1.84. * DM uncontrolled. HGA1c 7.4. \par \par Previous history (29Jan19): * HTN borderline controlled. /80s  at home. No lightheadedness. Compliant with medications. Following low salt diet.  * In 2016 he had stenting of left renal artery. * He requires omeprazole for severe GERD and ranitidine has been ineffective. * Urine albumin 131 mg/g.* CKD stable, creatinine 1.84.  * He is following up closely with urologist for elevated PSA. * DM controlled, HGA1c 6.8. *  24 hour urine on 2/18 showed low urine citrate. His last kidney stone was in 2016. 11/17 US did not show nephrolithiasis. He was unable to tolerate potassium citrate due to GERD. \par \par \par \par

## 2023-06-01 ENCOUNTER — RX RENEWAL (OUTPATIENT)
Age: 78
End: 2023-06-01

## 2023-06-26 DIAGNOSIS — K82.4 CHOLESTEROLOSIS OF GALLBLADDER: ICD-10-CM

## 2023-06-29 PROBLEM — K82.4 GALL BLADDER POLYP: Status: ACTIVE | Noted: 2023-06-29

## 2023-07-06 NOTE — PHYSICAL EXAM
[General Appearance - Alert] : alert [General Appearance - In No Acute Distress] : in no acute distress [Sclera] : the sclera and conjunctiva were normal [PERRL With Normal Accommodation] : pupils were equal in size, round, and reactive to light [Extraocular Movements] : extraocular movements were intact [Outer Ear] : the ears and nose were normal in appearance [Oropharynx] : the oropharynx was normal [Neck Appearance] : the appearance of the neck was normal [Neck Cervical Mass (___cm)] : no neck mass was observed Name band; [Jugular Venous Distention Increased] : there was no jugular-venous distention [Thyroid Diffuse Enlargement] : the thyroid was not enlarged [Thyroid Nodule] : there were no palpable thyroid nodules [Auscultation Breath Sounds / Voice Sounds] : lungs were clear to auscultation bilaterally [Heart Rate And Rhythm] : heart rate was normal and rhythm regular [Heart Sounds] : normal S1 and S2 [Heart Sounds Gallop] : no gallops [Murmurs] : no murmurs [Heart Sounds Pericardial Friction Rub] : no pericardial rub [Veins - Varicosity Changes] : there were no varicosital changes [FreeTextEntry1] : pitting bilateral ankle edema [Bowel Sounds] : normal bowel sounds [Abdomen Soft] : soft [Abdomen Tenderness] : non-tender [Abdomen Mass (___ Cm)] : no abdominal mass palpated [Cervical Lymph Nodes Enlarged Posterior Bilaterally] : posterior cervical [Cervical Lymph Nodes Enlarged Anterior Bilaterally] : anterior cervical [Supraclavicular Lymph Nodes Enlarged Bilaterally] : supraclavicular [Abnormal Walk] : normal gait [Nail Clubbing] : no clubbing  or cyanosis of the fingernails [Musculoskeletal - Swelling] : no joint swelling seen [Motor Tone] : muscle strength and tone were normal [Skin Color & Pigmentation] : normal skin color and pigmentation [Skin Turgor] : normal skin turgor [] : no rash [Oriented To Time, Place, And Person] : oriented to person, place, and time [Impaired Insight] : insight and judgment were intact [Affect] : the affect was normal

## 2023-08-23 RX ORDER — BLOOD SUGAR DIAGNOSTIC
STRIP MISCELLANEOUS TWICE DAILY
Qty: 180 | Refills: 3 | Status: ACTIVE | COMMUNITY
Start: 2019-12-18 | End: 1900-01-01

## 2023-09-15 ENCOUNTER — APPOINTMENT (OUTPATIENT)
Dept: NEPHROLOGY | Facility: CLINIC | Age: 78
End: 2023-09-15
Payer: MEDICARE

## 2023-09-15 VITALS — HEART RATE: 56 BPM | DIASTOLIC BLOOD PRESSURE: 75 MMHG | SYSTOLIC BLOOD PRESSURE: 135 MMHG

## 2023-09-15 DIAGNOSIS — N20.0 CALCULUS OF KIDNEY: ICD-10-CM

## 2023-09-15 PROCEDURE — 99214 OFFICE O/P EST MOD 30 MIN: CPT

## 2023-10-29 ENCOUNTER — RX RENEWAL (OUTPATIENT)
Age: 78
End: 2023-10-29

## 2023-10-29 RX ORDER — DAPAGLIFLOZIN 10 MG/1
10 TABLET, FILM COATED ORAL
Qty: 90 | Refills: 3 | Status: ACTIVE | COMMUNITY
Start: 2020-12-18 | End: 1900-01-01

## 2024-01-09 ENCOUNTER — APPOINTMENT (OUTPATIENT)
Dept: NEPHROLOGY | Facility: CLINIC | Age: 79
End: 2024-01-09
Payer: MEDICARE

## 2024-01-09 ENCOUNTER — APPOINTMENT (OUTPATIENT)
Dept: ENDOCRINOLOGY | Facility: CLINIC | Age: 79
End: 2024-01-09
Payer: MEDICARE

## 2024-01-09 VITALS — SYSTOLIC BLOOD PRESSURE: 140 MMHG | HEART RATE: 50 BPM | DIASTOLIC BLOOD PRESSURE: 70 MMHG

## 2024-01-09 VITALS
DIASTOLIC BLOOD PRESSURE: 84 MMHG | SYSTOLIC BLOOD PRESSURE: 159 MMHG | HEIGHT: 66 IN | WEIGHT: 165 LBS | BODY MASS INDEX: 26.52 KG/M2 | HEART RATE: 42 BPM

## 2024-01-09 VITALS — WEIGHT: 165 LBS | BODY MASS INDEX: 26.63 KG/M2

## 2024-01-09 DIAGNOSIS — I70.1 ATHEROSCLEROSIS OF RENAL ARTERY: ICD-10-CM

## 2024-01-09 DIAGNOSIS — R80.9 PROTEINURIA, UNSPECIFIED: ICD-10-CM

## 2024-01-09 LAB — GLUCOSE BLDC GLUCOMTR-MCNC: 93

## 2024-01-09 PROCEDURE — 82962 GLUCOSE BLOOD TEST: CPT

## 2024-01-09 PROCEDURE — 99214 OFFICE O/P EST MOD 30 MIN: CPT

## 2024-01-09 PROCEDURE — 99214 OFFICE O/P EST MOD 30 MIN: CPT | Mod: 25

## 2024-01-09 PROCEDURE — 83036 HEMOGLOBIN GLYCOSYLATED A1C: CPT | Mod: QW

## 2024-01-10 LAB — HBA1C MFR BLD HPLC: 7.7

## 2024-01-11 LAB
APPEARANCE: CLEAR
BILIRUBIN URINE: NEGATIVE
BLOOD URINE: NEGATIVE
COLOR: YELLOW
CREAT SPEC-SCNC: 33 MG/DL
GLUCOSE QUALITATIVE U: 500 MG/DL
KETONES URINE: NEGATIVE MG/DL
LEUKOCYTE ESTERASE URINE: NEGATIVE
MICROALBUMIN 24H UR DL<=1MG/L-MCNC: 4.5 MG/DL
MICROALBUMIN/CREAT 24H UR-RTO: 137 MG/G
NITRITE URINE: NEGATIVE
PH URINE: 6
PROTEIN URINE: NEGATIVE MG/DL
SPECIFIC GRAVITY URINE: 1.01
UROBILINOGEN URINE: 0.2 MG/DL

## 2024-02-08 ENCOUNTER — APPOINTMENT (OUTPATIENT)
Dept: HEART AND VASCULAR | Facility: CLINIC | Age: 79
End: 2024-02-08
Payer: MEDICARE

## 2024-02-08 ENCOUNTER — APPOINTMENT (OUTPATIENT)
Dept: ENDOCRINOLOGY | Facility: CLINIC | Age: 79
End: 2024-02-08

## 2024-02-08 VITALS
HEIGHT: 66 IN | WEIGHT: 164 LBS | HEART RATE: 65 BPM | BODY MASS INDEX: 26.36 KG/M2 | SYSTOLIC BLOOD PRESSURE: 140 MMHG | DIASTOLIC BLOOD PRESSURE: 80 MMHG

## 2024-02-08 DIAGNOSIS — R60.0 LOCALIZED EDEMA: ICD-10-CM

## 2024-02-08 DIAGNOSIS — Z86.79 OTHER SPECIFIED POSTPROCEDURAL STATES: ICD-10-CM

## 2024-02-08 DIAGNOSIS — Z98.890 OTHER SPECIFIED POSTPROCEDURAL STATES: ICD-10-CM

## 2024-02-08 DIAGNOSIS — I20.9 ANGINA PECTORIS, UNSPECIFIED: ICD-10-CM

## 2024-02-08 DIAGNOSIS — F17.290 NICOTINE DEPENDENCE, OTHER TOBACCO PRODUCT, UNCOMPLICATED: ICD-10-CM

## 2024-02-08 PROCEDURE — 99204 OFFICE O/P NEW MOD 45 MIN: CPT | Mod: 25

## 2024-02-08 PROCEDURE — 93000 ELECTROCARDIOGRAM COMPLETE: CPT

## 2024-02-08 RX ORDER — ATORVASTATIN CALCIUM 40 MG/1
40 TABLET, FILM COATED ORAL
Qty: 90 | Refills: 3 | Status: COMPLETED | COMMUNITY
End: 2024-02-08

## 2024-02-08 RX ORDER — LISINOPRIL 10 MG/1
10 TABLET ORAL DAILY
Qty: 3 | Refills: 2 | Status: DISCONTINUED | COMMUNITY
Start: 2020-07-23 | End: 2024-02-08

## 2024-02-08 RX ORDER — ROSUVASTATIN CALCIUM 40 MG/1
40 TABLET, FILM COATED ORAL
Qty: 90 | Refills: 3 | Status: ACTIVE | COMMUNITY
Start: 2024-02-08 | End: 1900-01-01

## 2024-02-11 PROBLEM — R60.0 EDEMA OF EXTREMITIES: Status: ACTIVE | Noted: 2024-02-11

## 2024-02-11 NOTE — REVIEW OF SYSTEMS
[Chest Discomfort] : chest discomfort [Fever] : no fever [SOB] : no shortness of breath [Chills] : no chills [Lower Ext Edema] : no extremity edema [Leg Claudication] : no intermittent leg claudication [Dyspnea on exertion] : not dyspnea during exertion [PND] : no PND [Orthopnea] : no orthopnea [Palpitations] : no palpitations [Vomiting] : no vomiting [Syncope] : no syncope [Nausea] : no nausea [Heartburn] : no heartburn [Diarrhea] : diarrhea [Dizziness] : no dizziness [Weakness] : no weakness [Numbness (Hypoesthesia)] : no numbness [Speech Disturbance] : no speech disturbance [Easy Bleeding] : no tendency for easy bleeding

## 2024-02-11 NOTE — PHYSICAL EXAM
[Well Developed] : well developed [Well Nourished] : well nourished [No Acute Distress] : no acute distress [No Carotid Bruit] : no carotid bruit [Normal S1, S2] : normal S1, S2 [No Murmur] : no murmur [Clear Lung Fields] : clear lung fields [Good Air Entry] : good air entry [Moves all extremities] : moves all extremities [No Focal Deficits] : no focal deficits [Normal Speech] : normal speech [Alert and Oriented] : alert and oriented [Normal memory] : normal memory [Venous stasis] : venous stasis [de-identified] : Right DP 2+, left 1+ [de-identified] : Non pitting 1+ edema, hyperpigmentation

## 2024-02-11 NOTE — HISTORY OF PRESENT ILLNESS
[FreeTextEntry1] : 76 year male with PMHX, former smoker ( quit > 7 years ago) of DM2, CKD, HTN, HLD, renal artery stenosis and AAA with stent ( 2015 [facility] Dr Maged Zhang ) sent in from his endocrinologist for cardiac evaluation.  He is reporting left sided chest pains for about 40 years ago. Last evaluation from a cardiologist / gastroenterologist told him it was from a abdominal hernia. He remains to have left sided pressure under left breast with last time this morning. He feels this during rest and exertion. He notices this with associated burping with long period on his bike and large intake of food. He has no dyspnea on exertion.   Baseline activity level: he can walk up 4 flights of stairs in his home without complications  Patient denies any palpitations, shortness of breath at rest, dizziness, falls, syncope or neuro focal deficits.  Patient has been noted for bradycardia with HR around 40'sbpm. His BBlocker has been tapering down. His blood pressures averaged around 120's/70's. He was also recently taking off of his Lisinopril x1 month because of abnormal K. His BP is gradually increasing to 130-140's / 70'smmHg. He has follow up with Dr Humphrey for adjustment next week.   OF NOTE: He continues to follow up with his vascular surgeon annually. He was not started on ASA 81mg  Previous Work Up LABS ( 02/01/2024 ) K 4.5, BUn / Cr 38/1.66, GFR 42 LABS (01/31/2024) Total Chol 175, HDL 48, LDL 95, A1c 7.3

## 2024-02-11 NOTE — DISCUSSION/SUMMARY
[FreeTextEntry1] : 76 year male with PMHX, former smoker ( quit > 7 years ago) of DM2, CKD, HTN, HLD, renal artery stenosis and AAA with stent ( 2015 [facility] Dr Maged Zhang ) sent in from his endocrinologist for cardiac evaluation.  Exertional Chest Pain; Minimal change on EKG noted. SR 65 bpm with TWI in lateral leads. Will return for an echocardiogram to asses cardiac structure and NST to rule out CAD.  HTN: Goal < 130/80. Trending up. He is following up with nephrology next week for treatment adjustment.  HLD: Goal LDL < 70.Last LDl at 95 (01/2024 ) Will switch to Crestor 40mg.   Return for an echocariogram  I, Dr. Alba Lovelace personally performed the evaluation and management services for this new patient who presents today with a new problem.  The evaluation and management includes conducting the examination assessing all conditions and establishing a new plan of care.  Today my ACP Niyah Walters was here and recorded the evaluation and management services.   The patient has multiple risk factors including diabetes and chronic kidney disease.  He has atherosclerotic disease of his aorta.  There are some exertional component to his chest pain.  He will undergo nuclear stress test.

## 2024-02-12 ENCOUNTER — APPOINTMENT (OUTPATIENT)
Dept: NEPHROLOGY | Facility: CLINIC | Age: 79
End: 2024-02-12
Payer: MEDICARE

## 2024-02-12 DIAGNOSIS — E87.5 HYPERKALEMIA: ICD-10-CM

## 2024-02-12 DIAGNOSIS — R79.9 ABNORMAL FINDING OF BLOOD CHEMISTRY, UNSPECIFIED: ICD-10-CM

## 2024-02-12 PROCEDURE — 99442: CPT | Mod: 93

## 2024-02-12 RX ORDER — ATORVASTATIN CALCIUM 20 MG/1
20 TABLET, FILM COATED ORAL
Qty: 90 | Refills: 0 | Status: DISCONTINUED | COMMUNITY
Start: 2023-12-11

## 2024-02-12 NOTE — ASSESSMENT
[FreeTextEntry1] : --  # Hyperkalemia may have been false positve (hemolysis). * Restart lower dose of lisinopril (5 mg). * Strict low K diet. * Follow up in 1 month.  # CKD stage 3. * Cont farxiga. * Check U/A. * Cannot start finerenone given hx of hyperkalemia (and not on max dose lisinopril). * Therapies for kidney disease: blood pressure control; DM control; proteinuria reduction with ARB/ACEi; SGLT2 inhibitor; other evidence-based therapies recommended including exercise, a plant-based lower oxalate diet, and 400 mcg folic acid daily * Cardiovascular disease prevention: counseling on healthy diet, physical activity, weight loss, alcohol limitation, blood pressure control; cholesterol therapy; cardiology evaluation/followup advised * A counseling information sheet on CKD has been given (which they have been instructed to read). * The patient has been counseled that chronic kidney disease is a significant condition and regular office follow-up with me (at least every 1 months for now) is important for monitoring and their health, and that it is their responsibility to make a follow-up appointment. * The patient has been counseled never to stop taking their medications without discussing it with me or another doctor. * The patient has been counseled on avoiding NSAIDs. * The patient has been counseled on risk of worsening kidney function and instructed to immediately call and speak with me and go immediately to ER with any severe symptoms, nausea, vomiting, diarrhea, chest pain, or shortness of breath.  # Prostate cancer. * Follow up with oncology.  # DM. * Follow up HGA1c. * Follow up with Dr. Lezama.  # HTN un controlled. * Check BP at home on lisinopril.  * Cont amlodipine, lisinopril, metoprolol.  * Will defer d/c of metoprolol to cardiology given PVD. * A counsling information sheet on blood pressure and staying healthy which they have been instructed to read has been given. * The patient has been counseled to check their BP at home with an automatic arm cuff, write down the readings, and reach me directly on the phone immediately if they are persistently > 180 systolic or if SBP is less than 100 or if lightheadedness develops. They were counseled to bring in all blood pressure readings and medications next visit. * The patient has been counseled that regular office followup (at least every 1 months for now) is important for monitoring and for their health, and that it is their responsibility to make follow up appointments. * The patient also has been counseled that they must never stop or change any medications without discussing this with me (or another physician).  # Kidney stones. * Maintain high fluid intake. * Check US.  # Hyperchol. * Cont crestor.

## 2024-02-12 NOTE — HISTORY OF PRESENT ILLNESS
[Home] : at home, [unfilled] , at the time of the visit. [Medical Office: ([facility])___] : at the medical office located in  [Verbal consent obtained from patient] : the patient, [unfilled] [FreeTextEntry1] : Referred by Dr. Lezama for CKD. PCP: Dr. RAMESH.  * Jan 31 K was 6.3 possibly due to hemolysis. Cr 1.45. He stopped lisinopril 10 mg for only 1 night and K was 4.5. BP now 140 - 160s.   Previous history (09Jan24): * HTN borderline uncontrolled.  - 130s at home. No lightheadedness. No CP/SOB. Compliant with medications. * HR 40s - 50s. Cycling frequently. No syncope. * S/P stenting of left MOISÉS 7 years ago. * No renal colic. * CKD stable, creatinine 1.61 in 11/21/23. K 4.2. * LDL 77. On atorvastatin 20. * HGA1c 7.1. * On Low K diet.   Previous history (15Sep23): Labs from 26Jul23 reviewed. Creatinine 1.73. K 5.6. HG1c 6.8. Alb 100 mg/g.  K reportedly decreased to normal range on following tests. * Doesn't check BP at home.  No lightheadedness. No CP/SOB. Compliant with medications. * No sx of kidney stones.   Previous history (30May23): * Cr 1.79, eGFR 39 on 18May23. BP controlled. BP 110s at home. No lightheadedness. No CP/SOB. Compliant with medications. * * DM controlled, HGA1c 7. * 63 mg albuminuria. * No renal colic. Last in 2015.   Previous history (24Feb23): *  HTN controlled. BP 110s at home. No lightheadedness. No CP/SOB. Compliant with medications. * CKD stable, creatinine 1.73 in 2/19/23. * DM controlled. HGA1c 7. Alb 110.   Previous history (14Oct22): *  HTN controlled. BP 120s. No lightheadedness. No CP/SOB. Compliant with medications. * CKD stable, creatinine 1.6. * DM controlled 7. * 112 mg/d. * No urinary symptoms on farxiga.   Previous history (12Aug22): * HTN controlled.  - 120s at home. No lightheadedness. No CP/SOB. Compliant with medications. * CKD stable, creatinine 1.45, on 6/4. HGA1c 6.6. * Off farxiga due to dysuriia when he had been it on 2 years, however he went to urologist and oncologist and was told he had no infection and this wasn't related to farxiga. They said this was related to radiation.   Previous history (26Jlt41): * Labs from 2/10 reviewed. CKD stable, creatinine 1.89. * * HTN controlled. No lightheadedness. Compliant with medications. * DM controlled, HGA1c 6.7. * Only at night after urinating he occasionally has decreased stream with a small amount of dysuria at the end of urination. No back pain or fever or chills. This has occurred for 3 weeks.   Previous history (05Nov21): * CKD stable, creatinine 1.75. * HTN controlled.  - 120s at home. No lightheadedness. Compliant with medications. * No recent kidney stones. * DM borderline controlled, HGA1c 7.3.   Previous history (57Ecz17): * HTN controlled.  - 120s at home. No lightheadedness. Compliant with medications. * CKD stable, creatinine 1.63. * No recent kidney stones. * DM controlled,HGA1c 7. * 63 - 111 mg albuminuria.   Previous history (29Mar): * Receiving radiation therapy for prostate cancer. * CKD stable, creatinine 1.87. Urine albumin 63. * Hyperkalemia improved on low K diet. * No recent kidney stones. * HTN controlled, BP 120s/70s.   Previous history (25Jan21): * Dx with prostate cancer. Hormonal treatment started. No symptoms. * CKD stable, creatinine 1.9. * K increased to 5.6, he is now following strict low K diet. * No recent kidney stones. * On farxiga for proteinuria. * HTN controlled, 110 - 130s/70s. Average 120s/70s.   Previous history (06Ain06): * Dx with prostate cancer. Hormonal treatment started. * CKD stable, creatinine 1.85 in September 2020. US 2018 no stones.  * Hyperkalemia improved. * No recent kidney stones. * 115 mg albuminuria.  * HTN borderline controlled.    Previous history (56Pmi24): * Scheduled for prostate biopsy under MAC with Dr. Hinojosa September 10th. . * CKD stable, [id number] creatinine 1.87. * Hyperkalemia improved. * DM controlled. * 300 mg proteinuria. * No recent kidney stones. * HTN controlled at home, SBP 110s-120/70. He stopped torsemide and sodium bicarbonate. * No chest pain or SOB with 10 minutes+ of aerobic exercise daily.   Previous history (04Jun20): * HTN controlled, BP 120s/70s. No lightheadedness. * CKD stable, creatinine 1.87. * He is following up with urologist for elevated PSA. * K increased to 5.5. He has been eating bananas (despite being counseled not to) and will now stop. * DM controlled, HGA1c 6.6. * No kidney stone for 4 years. Attempting to drink 2-3 liters daily.   Previous history (18Dec19): * He is following up with urologist for elevated PSA. * CKD relatively stable, creatinine up to 1.85. * HTN controlled. Average /70s at home No lightheadedness. Compliant with medications. DM controlled, HGA1c 6.5. * Albuminuria 132 mg/g.   Previous history (25Sep19): * Elevated PSA to 7.31. He is following up closely with urologist. * CKD stable, creatinine 1.85. * HTN controlled. No lightheadedness. Compliant with medications. * Slight rhinitis.   Previous history (20Aug19): * CKD stable, creatinine 1.83. * BP 130s at home. He decreased diuretic to every other day. * Urine protein decreased to 300 mg/g.   Previous history (07May19): * CKD stable, creatinine 1.89 last visit. * HTN previously uncontrolled but has decreased to 120/70s at home on lisinopril. *  Urine protein decreased to 400 mg/g. *   Previous history (02Apr19): * CKD stable, creatinine 1.84. * DM uncontrolled. HGA1c 7.4.   Previous history (29Jan19): * HTN borderline controlled. /80s  at home. No lightheadedness. Compliant with medications. Following low salt diet.  * In 2016 he had stenting of left renal artery. * He requires omeprazole for severe GERD and ranitidine has been ineffective. * Urine albumin 131 mg/g.* CKD stable, creatinine 1.84.  * He is following up closely with urologist for elevated PSA. * DM controlled, HGA1c 6.8. *  24 hour urine on 2/18 showed low urine citrate. His last kidney stone was in 2016. 11/17 US did not show nephrolithiasis. He was unable to tolerate potassium citrate due to GERD.

## 2024-02-15 ENCOUNTER — APPOINTMENT (OUTPATIENT)
Dept: OTOLARYNGOLOGY | Facility: CLINIC | Age: 79
End: 2024-02-15
Payer: MEDICARE

## 2024-02-15 VITALS
HEIGHT: 66 IN | HEART RATE: 56 BPM | WEIGHT: 164 LBS | SYSTOLIC BLOOD PRESSURE: 165 MMHG | TEMPERATURE: 98 F | DIASTOLIC BLOOD PRESSURE: 84 MMHG | BODY MASS INDEX: 26.36 KG/M2 | OXYGEN SATURATION: 99 %

## 2024-02-15 DIAGNOSIS — H90.3 SENSORINEURAL HEARING LOSS, BILATERAL: ICD-10-CM

## 2024-02-15 DIAGNOSIS — Z86.39 PERSONAL HISTORY OF OTHER ENDOCRINE, NUTRITIONAL AND METABOLIC DISEASE: ICD-10-CM

## 2024-02-15 DIAGNOSIS — Z85.9 PERSONAL HISTORY OF MALIGNANT NEOPLASM, UNSPECIFIED: ICD-10-CM

## 2024-02-15 DIAGNOSIS — Z78.9 OTHER SPECIFIED HEALTH STATUS: ICD-10-CM

## 2024-02-15 DIAGNOSIS — Z87.448 PERSONAL HISTORY OF OTHER DISEASES OF URINARY SYSTEM: ICD-10-CM

## 2024-02-15 DIAGNOSIS — Z86.79 PERSONAL HISTORY OF OTHER DISEASES OF THE CIRCULATORY SYSTEM: ICD-10-CM

## 2024-02-15 PROCEDURE — 99203 OFFICE O/P NEW LOW 30 MIN: CPT

## 2024-02-15 PROCEDURE — 92550 TYMPANOMETRY & REFLEX THRESH: CPT | Mod: 52

## 2024-02-15 PROCEDURE — 92557 COMPREHENSIVE HEARING TEST: CPT

## 2024-02-15 NOTE — ASSESSMENT
[FreeTextEntry1] : b snhl - showed b hf snhl; b nl tympanograms -results reviewed with pt  -recommended hae- he prefers to hold off for now RTC in 1 yr with rpt ; call sooner for any issues

## 2024-02-15 NOTE — HISTORY OF PRESENT ILLNESS
[de-identified] : 77 y/o M presents with b gradual hearing loss over the past 10 years. He denies tinnitus. He denies h/o ear infections or childhood ear issues. He states that his wife notices his hl. His mother had h/o hearing loss. No significant noise exposure. Former smoker, he quit in 2016.

## 2024-02-20 ENCOUNTER — RESULT REVIEW (OUTPATIENT)
Age: 79
End: 2024-02-20

## 2024-02-20 ENCOUNTER — OUTPATIENT (OUTPATIENT)
Dept: OUTPATIENT SERVICES | Facility: HOSPITAL | Age: 79
LOS: 1 days | End: 2024-02-20
Payer: MEDICARE

## 2024-02-20 DIAGNOSIS — I20.9 ANGINA PECTORIS, UNSPECIFIED: ICD-10-CM

## 2024-02-20 PROCEDURE — 78452 HT MUSCLE IMAGE SPECT MULT: CPT | Mod: 26,MH

## 2024-02-20 PROCEDURE — 93018 CV STRESS TEST I&R ONLY: CPT

## 2024-02-20 PROCEDURE — 93017 CV STRESS TEST TRACING ONLY: CPT

## 2024-02-20 PROCEDURE — A9500: CPT

## 2024-02-20 PROCEDURE — 78452 HT MUSCLE IMAGE SPECT MULT: CPT

## 2024-02-20 PROCEDURE — 93016 CV STRESS TEST SUPVJ ONLY: CPT

## 2024-02-22 ENCOUNTER — APPOINTMENT (OUTPATIENT)
Dept: HEART AND VASCULAR | Facility: CLINIC | Age: 79
End: 2024-02-22
Payer: COMMERCIAL

## 2024-02-22 PROCEDURE — 99442: CPT

## 2024-02-23 ENCOUNTER — RX RENEWAL (OUTPATIENT)
Age: 79
End: 2024-02-23

## 2024-02-23 RX ORDER — LISINOPRIL 5 MG/1
5 TABLET ORAL
Refills: 0 | Status: ACTIVE | COMMUNITY

## 2024-02-23 RX ORDER — METOPROLOL SUCCINATE 25 MG/1
25 TABLET, EXTENDED RELEASE ORAL DAILY
Qty: 90 | Refills: 3 | Status: ACTIVE | COMMUNITY
Start: 2020-03-05 | End: 1900-01-01

## 2024-02-23 RX ORDER — OMEPRAZOLE 20 MG/1
20 CAPSULE, DELAYED RELEASE ORAL
Refills: 0 | Status: ACTIVE | COMMUNITY

## 2024-03-14 ENCOUNTER — APPOINTMENT (OUTPATIENT)
Dept: HEART AND VASCULAR | Facility: CLINIC | Age: 79
End: 2024-03-14
Payer: MEDICARE

## 2024-03-14 VITALS
SYSTOLIC BLOOD PRESSURE: 151 MMHG | WEIGHT: 164 LBS | DIASTOLIC BLOOD PRESSURE: 83 MMHG | OXYGEN SATURATION: 96 % | TEMPERATURE: 97.3 F | HEIGHT: 66 IN | HEART RATE: 58 BPM | BODY MASS INDEX: 26.36 KG/M2

## 2024-03-14 DIAGNOSIS — I10 ESSENTIAL (PRIMARY) HYPERTENSION: ICD-10-CM

## 2024-03-14 DIAGNOSIS — I71.40 ABDOMINAL AORTIC ANEURYSM, WITHOUT RUPTURE, UNSPECIFIED: ICD-10-CM

## 2024-03-14 DIAGNOSIS — R94.39 ABNORMAL RESULT OF OTHER CARDIOVASCULAR FUNCTION STUDY: ICD-10-CM

## 2024-03-14 DIAGNOSIS — R07.89 OTHER CHEST PAIN: ICD-10-CM

## 2024-03-14 DIAGNOSIS — R94.31 ABNORMAL ELECTROCARDIOGRAM [ECG] [EKG]: ICD-10-CM

## 2024-03-14 PROCEDURE — 99214 OFFICE O/P EST MOD 30 MIN: CPT | Mod: 25

## 2024-03-14 PROCEDURE — 93306 TTE W/DOPPLER COMPLETE: CPT

## 2024-03-14 PROCEDURE — 93000 ELECTROCARDIOGRAM COMPLETE: CPT

## 2024-03-17 PROBLEM — R94.31 ABNORMAL EKG: Status: ACTIVE | Noted: 2024-02-08

## 2024-03-17 PROBLEM — R07.89 CHEST DISCOMFORT: Status: ACTIVE | Noted: 2024-02-11

## 2024-03-17 PROBLEM — I10 HYPERTENSION, ESSENTIAL: Status: ACTIVE | Noted: 2024-03-17

## 2024-03-17 PROBLEM — R94.39 ABNORMAL NUCLEAR STRESS TEST: Status: ACTIVE | Noted: 2024-02-23

## 2024-03-17 PROBLEM — I71.40 AAA (ABDOMINAL AORTIC ANEURYSM): Status: ACTIVE | Noted: 2018-06-18

## 2024-03-29 ENCOUNTER — APPOINTMENT (OUTPATIENT)
Dept: RADIATION ONCOLOGY | Facility: CLINIC | Age: 79
End: 2024-03-29
Payer: MEDICARE

## 2024-03-29 VITALS
DIASTOLIC BLOOD PRESSURE: 81 MMHG | BODY MASS INDEX: 26.96 KG/M2 | WEIGHT: 167.77 LBS | TEMPERATURE: 96.8 F | RESPIRATION RATE: 18 BRPM | HEIGHT: 66 IN | OXYGEN SATURATION: 98 % | SYSTOLIC BLOOD PRESSURE: 163 MMHG | HEART RATE: 53 BPM

## 2024-03-29 DIAGNOSIS — C61 MALIGNANT NEOPLASM OF PROSTATE: ICD-10-CM

## 2024-03-29 PROCEDURE — 99213 OFFICE O/P EST LOW 20 MIN: CPT

## 2024-03-29 NOTE — PHYSICAL EXAM
[Normal] : well developed, well nourished, in no acute distress [Extraocular Movements] : extraocular movements were intact [Sclera] : the sclera and conjunctiva were normal [] : no respiratory distress [Respiration, Rhythm And Depth] : normal respiratory rhythm and effort [Exaggerated Use Of Accessory Muscles For Inspiration] : no accessory muscle use [Nondistended] : nondistended [Abdomen Soft] : soft [Nail Clubbing] : no clubbing  or cyanosis of the fingernails [No Focal Deficits] : no focal deficits [Range of Motion to Joints] : range of motion to joints [Skin Color & Pigmentation] : normal skin color and pigmentation [Oriented To Time, Place, And Person] : oriented to person, place, and time [Affect] : the affect was normal [Outer Ear] : the ears and nose were normal in appearance [Arterial Pulses Normal] : the arterial pulses were normal [Hearing Threshold Finger Rub Not [place]] : hearing was normal [Edema] : no peripheral edema present [Motor Tone] : muscle strength and tone were normal [Mood] : the mood was normal [Not Anxious] : not anxious

## 2024-03-29 NOTE — DISEASE MANAGEMENT
[1] : T1 [0] : M0 [0-10] : 0 -10 ng/mL [8] : Fusion Biopsy Joey Score: 8 [5] : 5 [IIIB] : IIIB [Radiation Therapy] : Radiation Therapy [Treatment with radiation therapy] : Treatment with radiation therapy [EBRT] : EBRT [Treatment with androgen ablation] : Treatment with androgen ablation [MeasuredProstateVolume] : 79 [TotalCores] : 14 [TotalPositiveCores] : 5 [MaxCoreInvolvement] : 100 [RadiationCompletedDate] : 3/16/21 [EBRTDose] : 8140 [EBRTFractions] : 45 [ADTStartedDate] : 12/2020 [ADTDuration] : 4 mo

## 2024-03-29 NOTE — HISTORY OF PRESENT ILLNESS
[FreeTextEntry1] : Mr. Pruitt is a 78 year old male with history of AAA s/p repair in 2017, renal insufficiency, right renal artery stenosis, ureterolithotripsy, retinal detachment surgery and now with high risk prostate cancer, Joey 4+4=8 and pretreatment PSA of 8.43.  He underwent EBRT to a dose of 8100 Gy from 1/6/21 - 3/16/21. Of note, patient states that he received ADT injections with uro (Dec. 2020-March 2021).   He tolerated radiation treatment without developing any grade 3 or higher acute toxicity as a result of his treatment.  follow up with Dr. Inman 3/2022-  reported nocturia  2x, dysuria and weak flow. Not sexually active.  9/30/22: Seen for followup/transition of care to new MD.  Feels generally well.  nocturia 0-2x , good  stream, occasional urgency. Denies leakage, pain, hematuria or  bowel symptoms.  Not taking tamsulosin regularly. Not sexually active. IPSS/EPIC Score 6/11. Followed up with uro (Dr. Covington at [facility]) March 2022 and follows nephro for CKD.   3/23/23: Follow up Feels generally well and offers no acute complaints. Nocturia 0-2x, good stream. Rare dribbling with urgency. Denies dysuria, hematuria or bowel symptoms. Discontinued tamsulosin 5-6 months ago without worsening.  Not doing kegels and not sexually active without issue. No scheduled f/u with uro. IPSS/QOL/EPIC Score 2/2/8  PSA 7/15/20 - 8.43 - pre-tx ->3/16/21 RT ->12/ 2020-3/2021ADT 4/15/21 - 0.09  - post tx. 9/15/21 - 0.16 2/15/22 - 0.15  6/2/22 - 0.16 9/22/22 - 0.11 2/16/23 - 0.11 approx 3/2024 - 0.13 (per pt, not available for review)  Followup 3/29/24:  Denies any active GI/ issues; bowels are regular, has "latchkey" urgency and every once in a while takes flomax as a PRN if he notes increased frequency/urgency.  IPSS/QOL/EPIC: 5/2/7

## 2024-03-29 NOTE — REVIEW OF SYSTEMS
[Constipation: Grade 0] : Constipation: Grade 0 [Diarrhea: Grade 0] : Diarrhea: Grade 0 [Rectal Pain: Grade 0] : Rectal Pain: Grade 0 [Hematuria: Grade 0] : Hematuria: Grade 0 [Urinary Incontinence: Grade 0] : Urinary Incontinence: Grade 0  [Urinary Retention: Grade 0] : Urinary Retention: Grade 0 [Urinary Tract Pain: Grade 0] : Urinary Tract Pain: Grade 0 [Urinary Frequency: Grade 1 - Present] : Urinary Frequency: Grade 1 - Present [Urinary Urgency: Grade 1 - Present] : Urinary Urgency: Grade 1 - Present [Ejaculation Disorder: Grade 1 - Diminished ejaculation] : Ejaculation Disorder: Grade 1 - Diminished ejaculation  [Erectile Dysfunction: Grade 2 - Decrease in erectile function (frequency/rigidity of erections), erectile intervention indicated, (e.g., medication or mechanical devices such as penile pump)] : Erectile Dysfunction: Grade 2 - Decrease in erectile function (frequency/rigidity of erections), erectile intervention indicated, (e.g., medication or mechanical devices such as penile pump) [Nocturia] : nocturia [Urinary Frequency] : urinary frequency [IPSS Score (0-40): ___] : IPSS score: [unfilled] [EPIC-CP Score (0-60): ___] : EPIC-CP score: [unfilled] [Negative] : Allergic/Immunologic

## 2024-04-11 ENCOUNTER — APPOINTMENT (OUTPATIENT)
Dept: ENDOCRINOLOGY | Facility: CLINIC | Age: 79
End: 2024-04-11
Payer: MEDICARE

## 2024-04-11 VITALS
HEART RATE: 54 BPM | SYSTOLIC BLOOD PRESSURE: 147 MMHG | DIASTOLIC BLOOD PRESSURE: 88 MMHG | WEIGHT: 164 LBS | BODY MASS INDEX: 26.47 KG/M2

## 2024-04-11 DIAGNOSIS — E78.00 PURE HYPERCHOLESTEROLEMIA, UNSPECIFIED: ICD-10-CM

## 2024-04-11 DIAGNOSIS — E11.51 TYPE 2 DIABETES MELLITUS WITH DIABETIC PERIPHERAL ANGIOPATHY W/OUT GANGRENE: ICD-10-CM

## 2024-04-11 DIAGNOSIS — I10 ESSENTIAL (PRIMARY) HYPERTENSION: ICD-10-CM

## 2024-04-11 DIAGNOSIS — N18.30 CHRONIC KIDNEY DISEASE, STAGE 3 UNSPECIFIED: ICD-10-CM

## 2024-04-11 DIAGNOSIS — I25.10 ATHEROSCLEROTIC HEART DISEASE OF NATIVE CORONARY ARTERY W/OUT ANGINA PECTORIS: ICD-10-CM

## 2024-04-11 LAB — GLUCOSE BLDC GLUCOMTR-MCNC: 111

## 2024-04-11 PROCEDURE — 83036 HEMOGLOBIN GLYCOSYLATED A1C: CPT | Mod: QW

## 2024-04-11 PROCEDURE — 82962 GLUCOSE BLOOD TEST: CPT

## 2024-04-11 PROCEDURE — 99214 OFFICE O/P EST MOD 30 MIN: CPT

## 2024-04-11 PROCEDURE — G2211 COMPLEX E/M VISIT ADD ON: CPT

## 2024-04-11 NOTE — HISTORY OF PRESENT ILLNESS
[FreeTextEntry1] : 76 y.o. male with a h/o type 2 DM since 2016. The patient is currently on tx with Farxiga. He feels well. His weight is stable. On 9/26/22 HbA1C was 7%. HbA1C today is 6.7%, glucose -88 mg/dl. 4/24/23. The patient is doing well - has no complaints. His weight is stable. HbA1C was 7% on 2/16/23. Glucose today - 114 mg/dl. /9/24. The patient is feeling well. He has lost 5 lb. HbA1C was 7.1% on 11/20/23. Glucose today is 93 mg/dl, HbA1C - 7.7%. He continues on statin tx. 4/11/24. The patient is doing well. His weight is stable. HbA1C today is 6.9 %, glucose - 111 mg/dl. He is onrosuvastatin 40 mg with LDL of 67 on 4/8/24.

## 2024-04-11 NOTE — ASSESSMENT
[FreeTextEntry1] : DM, type 2. CKD Hyperlipidemia CAD  Will continue current management Medications refilled F/U 3-6 months

## 2024-04-24 ENCOUNTER — RX RENEWAL (OUTPATIENT)
Age: 79
End: 2024-04-24

## 2024-04-24 RX ORDER — AMLODIPINE BESYLATE 10 MG/1
10 TABLET ORAL
Qty: 90 | Refills: 3 | Status: ACTIVE | COMMUNITY
Start: 2022-05-18 | End: 1900-01-01

## 2024-07-12 ENCOUNTER — LABORATORY RESULT (OUTPATIENT)
Age: 79
End: 2024-07-12

## 2024-07-12 ENCOUNTER — APPOINTMENT (OUTPATIENT)
Dept: NEPHROLOGY | Facility: CLINIC | Age: 79
End: 2024-07-12
Payer: MEDICARE

## 2024-07-12 VITALS — DIASTOLIC BLOOD PRESSURE: 83 MMHG | HEART RATE: 59 BPM | SYSTOLIC BLOOD PRESSURE: 132 MMHG

## 2024-07-12 DIAGNOSIS — N18.30 CHRONIC KIDNEY DISEASE, STAGE 3 UNSPECIFIED: ICD-10-CM

## 2024-07-12 DIAGNOSIS — E78.00 PURE HYPERCHOLESTEROLEMIA, UNSPECIFIED: ICD-10-CM

## 2024-07-12 DIAGNOSIS — I10 ESSENTIAL (PRIMARY) HYPERTENSION: ICD-10-CM

## 2024-07-12 DIAGNOSIS — E11.29 TYPE 2 DIABETES MELLITUS WITH OTHER DIABETIC KIDNEY COMPLICATION: ICD-10-CM

## 2024-07-12 DIAGNOSIS — R79.9 ABNORMAL FINDING OF BLOOD CHEMISTRY, UNSPECIFIED: ICD-10-CM

## 2024-07-12 DIAGNOSIS — R68.89 OTHER GENERAL SYMPTOMS AND SIGNS: ICD-10-CM

## 2024-07-12 DIAGNOSIS — E11.51 TYPE 2 DIABETES MELLITUS WITH DIABETIC PERIPHERAL ANGIOPATHY W/OUT GANGRENE: ICD-10-CM

## 2024-07-12 DIAGNOSIS — Z79.899 OTHER LONG TERM (CURRENT) DRUG THERAPY: ICD-10-CM

## 2024-07-12 DIAGNOSIS — R80.9 TYPE 2 DIABETES MELLITUS WITH OTHER DIABETIC KIDNEY COMPLICATION: ICD-10-CM

## 2024-07-12 PROCEDURE — 99214 OFFICE O/P EST MOD 30 MIN: CPT

## 2024-07-12 PROCEDURE — G2211 COMPLEX E/M VISIT ADD ON: CPT

## 2024-07-12 PROCEDURE — 36415 COLL VENOUS BLD VENIPUNCTURE: CPT

## 2024-07-14 LAB
25(OH)D3 SERPL-MCNC: 47.7 NG/ML
ALBUMIN SERPL ELPH-MCNC: 4.6 G/DL
ALP BLD-CCNC: 68 U/L
ALT SERPL-CCNC: 30 U/L
ANION GAP SERPL CALC-SCNC: 16 MMOL/L
APPEARANCE: CLEAR
AST SERPL-CCNC: 28 U/L
BASOPHILS # BLD AUTO: 0.03 K/UL
BILIRUB SERPL-MCNC: 0.6 MG/DL
BILIRUBIN URINE: NEGATIVE
BUN SERPL-MCNC: 34 MG/DL
CALCIUM SERPL-MCNC: 9.4 MG/DL
CALCIUM SERPL-MCNC: 9.4 MG/DL
CHLORIDE SERPL-SCNC: 104 MMOL/L
CHOLEST SERPL-MCNC: 169 MG/DL
COLOR: YELLOW
CREAT SERPL-MCNC: 1.68 MG/DL
CREAT SPEC-SCNC: 67 MG/DL
CREAT SPEC-SCNC: 67 MG/DL
CREAT/PROT UR: 0.5 RATIO
CYSTATIN C SERPL-MCNC: 1.74 MG/L
EGFR: 42 ML/MIN/1.73M2
EOSINOPHIL # BLD AUTO: 0.12 K/UL
EOSINOPHIL NFR BLD AUTO: 1.7 %
ESTIMATED AVERAGE GLUCOSE: 171 MG/DL
FERRITIN SERPL-MCNC: 204 NG/ML
GLUCOSE QUALITATIVE U: >=1000 MG/DL
GLUCOSE SERPL-MCNC: 110 MG/DL
HBA1C MFR BLD HPLC: 7.6 %
HCT VFR BLD CALC: 47.7 %
HGB BLD-MCNC: 15 G/DL
IMM GRANULOCYTES NFR BLD AUTO: 0.1 %
KETONES URINE: NEGATIVE MG/DL
LDLC SERPL CALC-MCNC: 83 MG/DL
LEUKOCYTE ESTERASE URINE: NEGATIVE
LYMPHOCYTES NFR BLD AUTO: 22.1 %
MAGNESIUM SERPL-MCNC: 2.4 MG/DL
MAN DIFF?: NORMAL
MCHC RBC-ENTMCNC: 27 PG
MCHC RBC-ENTMCNC: 31.4 GM/DL
MCV RBC AUTO: 85.9 FL
MICROALBUMIN 24H UR DL<=1MG/L-MCNC: 12.6 MG/DL
MICROALBUMIN/CREAT 24H UR-RTO: 188 MG/G
MONOCYTES # BLD AUTO: 0.76 K/UL
MONOCYTES NFR BLD AUTO: 10.9 %
NEUTROPHILS # BLD AUTO: 4.52 K/UL
NEUTROPHILS NFR BLD AUTO: 64.8 %
NITRITE URINE: NEGATIVE
PARATHYROID HORMONE INTACT: 52 PG/ML
PH URINE: 5.5
PHOSPHATE SERPL-MCNC: 3.6 MG/DL
PLATELET # BLD AUTO: 208 K/UL
POTASSIUM SERPL-SCNC: 4.4 MMOL/L
PROT SERPL-MCNC: 7.2 G/DL
PROT UR-MCNC: 32 MG/DL
RBC # BLD: 5.55 M/UL
RBC # FLD: 15.6 %
SODIUM SERPL-SCNC: 139 MMOL/L
SPECIFIC GRAVITY URINE: 1.02
TRIGL SERPL-MCNC: 230 MG/DL
TSH SERPL-ACNC: 0.83 UIU/ML
UROBILINOGEN URINE: 0.2 MG/DL
VIT B12 SERPL-MCNC: 930 PG/ML
WBC # FLD AUTO: 6.98 K/UL

## 2024-07-22 ENCOUNTER — RX RENEWAL (OUTPATIENT)
Age: 79
End: 2024-07-22

## 2024-10-15 ENCOUNTER — RX RENEWAL (OUTPATIENT)
Age: 79
End: 2024-10-15

## 2024-11-11 ENCOUNTER — NON-APPOINTMENT (OUTPATIENT)
Age: 79
End: 2024-11-11

## 2024-11-19 ENCOUNTER — APPOINTMENT (OUTPATIENT)
Dept: NEPHROLOGY | Facility: CLINIC | Age: 79
End: 2024-11-19
Payer: MEDICARE

## 2024-11-19 ENCOUNTER — NON-APPOINTMENT (OUTPATIENT)
Age: 79
End: 2024-11-19

## 2024-11-19 DIAGNOSIS — N20.0 CALCULUS OF KIDNEY: ICD-10-CM

## 2024-11-19 DIAGNOSIS — N18.30 CHRONIC KIDNEY DISEASE, STAGE 3 UNSPECIFIED: ICD-10-CM

## 2024-11-19 DIAGNOSIS — I10 ESSENTIAL (PRIMARY) HYPERTENSION: ICD-10-CM

## 2024-11-19 PROCEDURE — 99442: CPT

## 2024-11-19 PROCEDURE — ZZZZZ: CPT

## 2024-11-19 PROCEDURE — 99441: CPT | Mod: 93

## 2024-12-10 ENCOUNTER — RX RENEWAL (OUTPATIENT)
Age: 79
End: 2024-12-10

## 2024-12-25 ENCOUNTER — RX RENEWAL (OUTPATIENT)
Age: 79
End: 2024-12-25

## 2025-01-09 ENCOUNTER — RX RENEWAL (OUTPATIENT)
Age: 80
End: 2025-01-09

## 2025-01-14 ENCOUNTER — NON-APPOINTMENT (OUTPATIENT)
Age: 80
End: 2025-01-14

## 2025-01-14 ENCOUNTER — APPOINTMENT (OUTPATIENT)
Dept: HEART AND VASCULAR | Facility: CLINIC | Age: 80
End: 2025-01-14
Payer: MEDICARE

## 2025-01-14 VITALS — SYSTOLIC BLOOD PRESSURE: 128 MMHG | DIASTOLIC BLOOD PRESSURE: 68 MMHG

## 2025-01-14 VITALS
HEIGHT: 66 IN | HEART RATE: 61 BPM | OXYGEN SATURATION: 96 % | TEMPERATURE: 97.3 F | DIASTOLIC BLOOD PRESSURE: 82 MMHG | BODY MASS INDEX: 27 KG/M2 | SYSTOLIC BLOOD PRESSURE: 160 MMHG | WEIGHT: 168 LBS

## 2025-01-14 VITALS — SYSTOLIC BLOOD PRESSURE: 132 MMHG | DIASTOLIC BLOOD PRESSURE: 69 MMHG

## 2025-01-14 VITALS — SYSTOLIC BLOOD PRESSURE: 134 MMHG | DIASTOLIC BLOOD PRESSURE: 72 MMHG

## 2025-01-14 DIAGNOSIS — I25.10 ATHEROSCLEROTIC HEART DISEASE OF NATIVE CORONARY ARTERY W/OUT ANGINA PECTORIS: ICD-10-CM

## 2025-01-14 DIAGNOSIS — R42 DIZZINESS AND GIDDINESS: ICD-10-CM

## 2025-01-14 DIAGNOSIS — R94.39 ABNORMAL RESULT OF OTHER CARDIOVASCULAR FUNCTION STUDY: ICD-10-CM

## 2025-01-14 PROCEDURE — 99214 OFFICE O/P EST MOD 30 MIN: CPT | Mod: 25

## 2025-01-14 PROCEDURE — 93000 ELECTROCARDIOGRAM COMPLETE: CPT

## 2025-01-15 PROBLEM — R42 DIZZINESS: Status: ACTIVE | Noted: 2025-01-15

## 2025-02-04 ENCOUNTER — APPOINTMENT (OUTPATIENT)
Dept: ENDOCRINOLOGY | Facility: CLINIC | Age: 80
End: 2025-02-04
Payer: MEDICARE

## 2025-02-04 VITALS
WEIGHT: 164 LBS | BODY MASS INDEX: 26.36 KG/M2 | HEIGHT: 66 IN | HEART RATE: 73 BPM | SYSTOLIC BLOOD PRESSURE: 147 MMHG | DIASTOLIC BLOOD PRESSURE: 85 MMHG

## 2025-02-04 DIAGNOSIS — N18.30 CHRONIC KIDNEY DISEASE, STAGE 3 UNSPECIFIED: ICD-10-CM

## 2025-02-04 DIAGNOSIS — I25.10 ATHEROSCLEROTIC HEART DISEASE OF NATIVE CORONARY ARTERY W/OUT ANGINA PECTORIS: ICD-10-CM

## 2025-02-04 DIAGNOSIS — E11.65 TYPE 2 DIABETES MELLITUS WITH HYPERGLYCEMIA: ICD-10-CM

## 2025-02-04 LAB
GLUCOSE BLDC GLUCOMTR-MCNC: 140
HBA1C MFR BLD HPLC: 7.7

## 2025-02-04 PROCEDURE — 83036 HEMOGLOBIN GLYCOSYLATED A1C: CPT | Mod: QW

## 2025-02-04 PROCEDURE — 99214 OFFICE O/P EST MOD 30 MIN: CPT

## 2025-02-04 PROCEDURE — 82962 GLUCOSE BLOOD TEST: CPT

## 2025-03-20 ENCOUNTER — NON-APPOINTMENT (OUTPATIENT)
Age: 80
End: 2025-03-20

## 2025-03-20 ENCOUNTER — LABORATORY RESULT (OUTPATIENT)
Age: 80
End: 2025-03-20

## 2025-03-20 ENCOUNTER — APPOINTMENT (OUTPATIENT)
Dept: NEPHROLOGY | Facility: CLINIC | Age: 80
End: 2025-03-20
Payer: MEDICARE

## 2025-03-20 VITALS — HEART RATE: 65 BPM | SYSTOLIC BLOOD PRESSURE: 128 MMHG | DIASTOLIC BLOOD PRESSURE: 76 MMHG

## 2025-03-20 VITALS — BODY MASS INDEX: 26.47 KG/M2 | WEIGHT: 164 LBS

## 2025-03-20 DIAGNOSIS — N18.30 CHRONIC KIDNEY DISEASE, STAGE 3 UNSPECIFIED: ICD-10-CM

## 2025-03-20 DIAGNOSIS — N20.0 CALCULUS OF KIDNEY: ICD-10-CM

## 2025-03-20 DIAGNOSIS — R80.9 TYPE 2 DIABETES MELLITUS WITH OTHER DIABETIC KIDNEY COMPLICATION: ICD-10-CM

## 2025-03-20 DIAGNOSIS — R97.20 ELEVATED PROSTATE, SPECIFIC ANTIGEN [PSA]: ICD-10-CM

## 2025-03-20 DIAGNOSIS — E11.29 TYPE 2 DIABETES MELLITUS WITH OTHER DIABETIC KIDNEY COMPLICATION: ICD-10-CM

## 2025-03-20 DIAGNOSIS — R68.89 OTHER GENERAL SYMPTOMS AND SIGNS: ICD-10-CM

## 2025-03-20 DIAGNOSIS — R79.9 ABNORMAL FINDING OF BLOOD CHEMISTRY, UNSPECIFIED: ICD-10-CM

## 2025-03-20 DIAGNOSIS — I10 ESSENTIAL (PRIMARY) HYPERTENSION: ICD-10-CM

## 2025-03-20 DIAGNOSIS — K82.4 CHOLESTEROLOSIS OF GALLBLADDER: ICD-10-CM

## 2025-03-20 PROCEDURE — 36415 COLL VENOUS BLD VENIPUNCTURE: CPT

## 2025-03-20 PROCEDURE — G2211 COMPLEX E/M VISIT ADD ON: CPT

## 2025-03-20 PROCEDURE — 99214 OFFICE O/P EST MOD 30 MIN: CPT

## 2025-03-21 LAB
25(OH)D3 SERPL-MCNC: 55.3 NG/ML
ALBUMIN SERPL ELPH-MCNC: 4.6 G/DL
ALP BLD-CCNC: 67 U/L
ALT SERPL-CCNC: 28 U/L
ANION GAP SERPL CALC-SCNC: 15 MMOL/L
APPEARANCE: CLEAR
AST SERPL-CCNC: 24 U/L
BASOPHILS # BLD AUTO: 0.04 K/UL
BASOPHILS NFR BLD AUTO: 0.6 %
BILIRUB SERPL-MCNC: 0.7 MG/DL
BILIRUBIN URINE: NEGATIVE
BLOOD URINE: NEGATIVE
BUN SERPL-MCNC: 47 MG/DL
CALCIUM SERPL-MCNC: 10.1 MG/DL
CALCIUM SERPL-MCNC: 10.1 MG/DL
CHLORIDE SERPL-SCNC: 105 MMOL/L
CHOLEST SERPL-MCNC: 184 MG/DL
CO2 SERPL-SCNC: 21 MMOL/L
COLOR: YELLOW
CREAT SERPL-MCNC: 1.85 MG/DL
CREAT SPEC-SCNC: 75 MG/DL
CREAT SPEC-SCNC: 75 MG/DL
CREAT/PROT UR: 0.5 RATIO
CYSTATIN C SERPL-MCNC: 1.85 MG/L
EGFRCR SERPLBLD CKD-EPI 2021: 37 ML/MIN/1.73M2
EOSINOPHIL # BLD AUTO: 0.08 K/UL
EOSINOPHIL NFR BLD AUTO: 1.1 %
ESTIMATED AVERAGE GLUCOSE: 180 MG/DL
FERRITIN SERPL-MCNC: 191 NG/ML
GFR/BSA.PRED SERPLBLD CYS-BASED-ARV: 32 ML/MIN/1.73M2
GLUCOSE QUALITATIVE U: >=1000 MG/DL
GLUCOSE SERPL-MCNC: 114 MG/DL
HBA1C MFR BLD HPLC: 7.9 %
HCT VFR BLD CALC: 49.8 %
HDLC SERPL-MCNC: 57 MG/DL
HGB BLD-MCNC: 15.5 G/DL
IMM GRANULOCYTES NFR BLD AUTO: 0.1 %
KETONES URINE: NEGATIVE MG/DL
LDLC SERPL-MCNC: 100 MG/DL
LEUKOCYTE ESTERASE URINE: NEGATIVE
LYMPHOCYTES # BLD AUTO: 1.47 K/UL
LYMPHOCYTES NFR BLD AUTO: 20.7 %
MAGNESIUM SERPL-MCNC: 2.2 MG/DL
MAN DIFF?: NORMAL
MCHC RBC-ENTMCNC: 27.1 PG
MCHC RBC-ENTMCNC: 31.1 G/DL
MCV RBC AUTO: 87.1 FL
MICROALBUMIN 24H UR DL<=1MG/L-MCNC: 14.2 MG/DL
MICROALBUMIN/CREAT 24H UR-RTO: 188 MG/G
MONOCYTES # BLD AUTO: 0.7 K/UL
MONOCYTES NFR BLD AUTO: 9.8 %
NEUTROPHILS # BLD AUTO: 4.81 K/UL
NEUTROPHILS NFR BLD AUTO: 67.7 %
NITRITE URINE: NEGATIVE
NONHDLC SERPL-MCNC: 126 MG/DL
PARATHYROID HORMONE INTACT: 34 PG/ML
PH URINE: 5.5
PHOSPHATE SERPL-MCNC: 4.1 MG/DL
PLATELET # BLD AUTO: 195 K/UL
POTASSIUM SERPL-SCNC: 4.6 MMOL/L
PROT SERPL-MCNC: 7.3 G/DL
PROT UR-MCNC: 35 MG/DL
PROTEIN URINE: 30 MG/DL
PSA SERPL-MCNC: 0.14 NG/ML
RBC # BLD: 5.72 M/UL
RBC # FLD: 16.2 %
SODIUM SERPL-SCNC: 141 MMOL/L
SPECIFIC GRAVITY URINE: 1.02
TRIGL SERPL-MCNC: 153 MG/DL
TSH SERPL-ACNC: 0.76 UIU/ML
UROBILINOGEN URINE: 0.2 MG/DL
VIT B12 SERPL-MCNC: 858 PG/ML
WBC # FLD AUTO: 7.11 K/UL

## 2025-04-11 ENCOUNTER — RX RENEWAL (OUTPATIENT)
Age: 80
End: 2025-04-11

## 2025-05-07 ENCOUNTER — APPOINTMENT (OUTPATIENT)
Dept: ENDOCRINOLOGY | Facility: CLINIC | Age: 80
End: 2025-05-07
Payer: MEDICARE

## 2025-05-07 ENCOUNTER — RESULT CHARGE (OUTPATIENT)
Age: 80
End: 2025-05-07

## 2025-05-07 VITALS
HEART RATE: 59 BPM | WEIGHT: 165 LBS | BODY MASS INDEX: 26.63 KG/M2 | SYSTOLIC BLOOD PRESSURE: 137 MMHG | DIASTOLIC BLOOD PRESSURE: 80 MMHG

## 2025-05-07 VITALS
BODY MASS INDEX: 26.63 KG/M2 | DIASTOLIC BLOOD PRESSURE: 80 MMHG | HEART RATE: 59 BPM | WEIGHT: 165 LBS | SYSTOLIC BLOOD PRESSURE: 137 MMHG

## 2025-05-07 DIAGNOSIS — I25.10 ATHEROSCLEROTIC HEART DISEASE OF NATIVE CORONARY ARTERY W/OUT ANGINA PECTORIS: ICD-10-CM

## 2025-05-07 DIAGNOSIS — N18.30 CHRONIC KIDNEY DISEASE, STAGE 3 UNSPECIFIED: ICD-10-CM

## 2025-05-07 DIAGNOSIS — E11.51 TYPE 2 DIABETES MELLITUS WITH DIABETIC PERIPHERAL ANGIOPATHY W/OUT GANGRENE: ICD-10-CM

## 2025-05-07 DIAGNOSIS — I10 ESSENTIAL (PRIMARY) HYPERTENSION: ICD-10-CM

## 2025-05-07 DIAGNOSIS — E78.00 PURE HYPERCHOLESTEROLEMIA, UNSPECIFIED: ICD-10-CM

## 2025-05-07 LAB
GLUCOSE BLDC GLUCOMTR-MCNC: 122
HBA1C MFR BLD HPLC: 6.7

## 2025-05-07 PROCEDURE — 83036 HEMOGLOBIN GLYCOSYLATED A1C: CPT | Mod: QW

## 2025-05-07 PROCEDURE — 99214 OFFICE O/P EST MOD 30 MIN: CPT

## 2025-05-07 PROCEDURE — 82962 GLUCOSE BLOOD TEST: CPT

## 2025-05-08 ENCOUNTER — APPOINTMENT (OUTPATIENT)
Dept: RADIATION ONCOLOGY | Facility: CLINIC | Age: 80
End: 2025-05-08
Payer: MEDICARE

## 2025-05-08 DIAGNOSIS — C61 MALIGNANT NEOPLASM OF PROSTATE: ICD-10-CM

## 2025-05-08 PROCEDURE — 99213 OFFICE O/P EST LOW 20 MIN: CPT | Mod: 2W

## 2025-05-29 ENCOUNTER — APPOINTMENT (OUTPATIENT)
Dept: HEART AND VASCULAR | Facility: CLINIC | Age: 80
End: 2025-05-29
Payer: MEDICARE

## 2025-05-29 VITALS
DIASTOLIC BLOOD PRESSURE: 75 MMHG | WEIGHT: 164 LBS | SYSTOLIC BLOOD PRESSURE: 132 MMHG | HEIGHT: 66 IN | BODY MASS INDEX: 26.36 KG/M2 | OXYGEN SATURATION: 95 % | HEART RATE: 55 BPM

## 2025-05-29 DIAGNOSIS — I10 ESSENTIAL (PRIMARY) HYPERTENSION: ICD-10-CM

## 2025-05-29 DIAGNOSIS — F17.290 NICOTINE DEPENDENCE, OTHER TOBACCO PRODUCT, UNCOMPLICATED: ICD-10-CM

## 2025-05-29 DIAGNOSIS — I77.810 THORACIC AORTIC ECTASIA: ICD-10-CM

## 2025-05-29 DIAGNOSIS — R94.39 ABNORMAL RESULT OF OTHER CARDIOVASCULAR FUNCTION STUDY: ICD-10-CM

## 2025-05-29 DIAGNOSIS — R07.89 OTHER CHEST PAIN: ICD-10-CM

## 2025-05-29 DIAGNOSIS — E78.00 PURE HYPERCHOLESTEROLEMIA, UNSPECIFIED: ICD-10-CM

## 2025-05-29 PROCEDURE — 99214 OFFICE O/P EST MOD 30 MIN: CPT | Mod: 25

## 2025-05-29 PROCEDURE — 93306 TTE W/DOPPLER COMPLETE: CPT

## 2025-05-29 PROCEDURE — 93000 ELECTROCARDIOGRAM COMPLETE: CPT

## 2025-05-29 PROCEDURE — ZZZZZ: CPT | Mod: NC

## 2025-05-29 RX ORDER — EZETIMIBE 10 MG/1
10 TABLET ORAL
Qty: 90 | Refills: 1 | Status: ACTIVE | COMMUNITY
Start: 2025-05-29 | End: 1900-01-01

## 2025-05-30 ENCOUNTER — NON-APPOINTMENT (OUTPATIENT)
Age: 80
End: 2025-05-30

## 2025-07-14 ENCOUNTER — APPOINTMENT (OUTPATIENT)
Dept: HEART AND VASCULAR | Facility: CLINIC | Age: 80
End: 2025-07-14

## 2025-07-21 ENCOUNTER — APPOINTMENT (OUTPATIENT)
Dept: NEPHROLOGY | Facility: CLINIC | Age: 80
End: 2025-07-21
Payer: MEDICARE

## 2025-07-21 VITALS — SYSTOLIC BLOOD PRESSURE: 119 MMHG | HEART RATE: 57 BPM | DIASTOLIC BLOOD PRESSURE: 70 MMHG

## 2025-07-21 DIAGNOSIS — N20.0 CALCULUS OF KIDNEY: ICD-10-CM

## 2025-07-21 DIAGNOSIS — N18.30 CHRONIC KIDNEY DISEASE, STAGE 3 UNSPECIFIED: ICD-10-CM

## 2025-07-21 DIAGNOSIS — E11.65 TYPE 2 DIABETES MELLITUS WITH HYPERGLYCEMIA: ICD-10-CM

## 2025-07-21 DIAGNOSIS — I10 ESSENTIAL (PRIMARY) HYPERTENSION: ICD-10-CM

## 2025-07-21 DIAGNOSIS — R80.9 PROTEINURIA, UNSPECIFIED: ICD-10-CM

## 2025-07-21 DIAGNOSIS — E87.5 HYPERKALEMIA: ICD-10-CM

## 2025-07-21 PROCEDURE — 99214 OFFICE O/P EST MOD 30 MIN: CPT

## 2025-07-21 PROCEDURE — G2211 COMPLEX E/M VISIT ADD ON: CPT

## 2025-07-24 ENCOUNTER — RX RENEWAL (OUTPATIENT)
Age: 80
End: 2025-07-24

## 2025-09-09 ENCOUNTER — RX RENEWAL (OUTPATIENT)
Age: 80
End: 2025-09-09

## 2025-09-11 ENCOUNTER — APPOINTMENT (OUTPATIENT)
Dept: ENDOCRINOLOGY | Facility: CLINIC | Age: 80
End: 2025-09-11
Payer: MEDICARE

## 2025-09-11 VITALS
SYSTOLIC BLOOD PRESSURE: 142 MMHG | HEART RATE: 70 BPM | BODY MASS INDEX: 26.63 KG/M2 | WEIGHT: 165 LBS | DIASTOLIC BLOOD PRESSURE: 90 MMHG

## 2025-09-11 DIAGNOSIS — I25.10 ATHEROSCLEROTIC HEART DISEASE OF NATIVE CORONARY ARTERY W/OUT ANGINA PECTORIS: ICD-10-CM

## 2025-09-11 DIAGNOSIS — E78.00 PURE HYPERCHOLESTEROLEMIA, UNSPECIFIED: ICD-10-CM

## 2025-09-11 DIAGNOSIS — E11.51 TYPE 2 DIABETES MELLITUS WITH DIABETIC PERIPHERAL ANGIOPATHY W/OUT GANGRENE: ICD-10-CM

## 2025-09-11 DIAGNOSIS — N18.30 CHRONIC KIDNEY DISEASE, STAGE 3 UNSPECIFIED: ICD-10-CM

## 2025-09-11 LAB
GLUCOSE BLDC GLUCOMTR-MCNC: 128
HBA1C MFR BLD HPLC: 7.4

## 2025-09-11 PROCEDURE — 83036 HEMOGLOBIN GLYCOSYLATED A1C: CPT | Mod: QW

## 2025-09-11 PROCEDURE — 99214 OFFICE O/P EST MOD 30 MIN: CPT

## 2025-09-11 PROCEDURE — 82962 GLUCOSE BLOOD TEST: CPT
